# Patient Record
Sex: FEMALE | Race: WHITE | NOT HISPANIC OR LATINO | Employment: STUDENT | URBAN - METROPOLITAN AREA
[De-identification: names, ages, dates, MRNs, and addresses within clinical notes are randomized per-mention and may not be internally consistent; named-entity substitution may affect disease eponyms.]

---

## 2019-02-22 ENCOUNTER — TELEPHONE (OUTPATIENT)
Dept: GASTROENTEROLOGY | Facility: CLINIC | Age: 24
End: 2019-02-22

## 2019-02-22 DIAGNOSIS — K50.019 CROHN'S DISEASE OF SMALL INTESTINE WITH COMPLICATION (HCC): Primary | ICD-10-CM

## 2019-02-22 RX ORDER — MESALAMINE 1.2 G/1
TABLET, DELAYED RELEASE ORAL
Qty: 180 TABLET | Refills: 1 | Status: SHIPPED | OUTPATIENT
Start: 2019-02-22 | End: 2019-03-29 | Stop reason: SDUPTHER

## 2019-02-22 NOTE — TELEPHONE ENCOUNTER
YAS mssg from Pt's Mom, Tristian Mosquera; states she is coming home from school this weekend and [facility]; [facility]  [phone]

## 2019-03-29 ENCOUNTER — OFFICE VISIT (OUTPATIENT)
Dept: GASTROENTEROLOGY | Facility: CLINIC | Age: 24
End: 2019-03-29
Payer: COMMERCIAL

## 2019-03-29 VITALS
HEIGHT: 64 IN | DIASTOLIC BLOOD PRESSURE: 82 MMHG | HEART RATE: 68 BPM | WEIGHT: 158 LBS | BODY MASS INDEX: 26.98 KG/M2 | SYSTOLIC BLOOD PRESSURE: 112 MMHG

## 2019-03-29 DIAGNOSIS — K50.00 CROHN'S DISEASE OF ILEUM WITHOUT COMPLICATION (HCC): Primary | ICD-10-CM

## 2019-03-29 DIAGNOSIS — K50.019 CROHN'S DISEASE OF SMALL INTESTINE WITH COMPLICATION (HCC): ICD-10-CM

## 2019-03-29 PROCEDURE — 99214 OFFICE O/P EST MOD 30 MIN: CPT | Performed by: NURSE PRACTITIONER

## 2019-03-29 RX ORDER — TOPIRAMATE 25 MG/1
25 TABLET ORAL DAILY
COMMUNITY
End: 2022-02-28

## 2019-03-29 RX ORDER — MESALAMINE 1.2 G/1
TABLET, DELAYED RELEASE ORAL
Qty: 180 TABLET | Refills: 1 | Status: SHIPPED | OUTPATIENT
Start: 2019-03-29 | End: 2020-05-05 | Stop reason: SDUPTHER

## 2019-03-29 NOTE — PROGRESS NOTES
[id number] - Outpatient Follow-up Note  Darwin Hankins 26 y o  female MRN: [id number]  Encounter: [id number]    ASSESSMENT AND PLAN:      1  Crohn's disease of ileum without complication ([address] )  Diagnosed with Crohn's ileitis in December 2018  Symptom control on mesalamine 2 4 g daily  She was on budesonide at time of initial diagnosis but has weaned off of this medication without any difficulty  - Comprehensive metabolic panel; Future  - CBC and differential; Future  - Magnesium; Future  - Vitamin B12; Future  - Folate; Future  - Sedimentation rate, automated; Future  - Vitamin D 25 hydroxy; Future  - continue mesalamine 2 4 g daily  - FL small bowel; Future      Followup Appointment[de-identified]  6 months  ______________________________________________________________________    Chief Complaint   Patient presents with    Crohn's Disease     follow up     HPI:  She returns to the office for follow-up to newly diagnosed Crohn's ileitis at the end of last year  She is doing well on daily mesalamine  Denies any abdominal pain, diarrhea, rectal bleeding, weight loss, fevers, chills, myalgias, arthralgias, skin rashes or lesions  She is curious as to whether her Crohn's is anywhere else in her small bowel  Historical Information   History reviewed  No pertinent past medical history  History reviewed  No pertinent surgical history    Social History     Substance and Sexual Activity   Alcohol Use Yes    Frequency: 2-4 times a month    Drinks per session: 1 or 2     Social History     Substance and Sexual Activity   Drug Use Never     Social History     Tobacco Use   Smoking Status Never Smoker   Smokeless Tobacco Never Used     Family History   Problem Relation Age of Onset    Hypertension Mother     Colon polyps Father     Hypertension Father     Diabetes Father     MAY disease Brother     Colon polyps Maternal Uncle     Crohn's disease Cousin          Current Outpatient Medications:     mesalamine (LIALDA) 1 2 g EC tablet    topiramate (TOPAMAX) 25 mg tablet  No Known Allergies    10 Point REVIEW OF SYSTEMS IS OTHERWISE NEGATIVE with the exception of headaches, numbness and tingling and anxiety  PHYSICAL EXAM:    Blood pressure 112/82, pulse 68, height 5' 4" (1 626 m), weight 71 7 kg (158 lb)  Body mass index is 27 12 kg/m²  General Appearance:  Alert, cooperative, no distress  HEENT:  Normocephalic, atraumatic, anicteric  Lungs: Clear to auscultation bilaterally; no rales, rhonchi or wheezing; respirations unlabored   Heart: Regular rate and rhythm; no murmur, rub, or gallop  Abdomen:  Soft, non-tender, non-distended; normal bowel sounds; no masses, no organomegaly   Rectal:  Deferred   Extremities: No cyanosis, clubbing or edema   Skin: No jaundice, rashes, or lesions   Back: No CVA tenderness      Lab Results:   No results found for: WBC, HGB, HCT, MCV, PLT  No results found for: NA, K, CL, CO2, ANIONGAP, BUN, CREATININE, GLUCOSE, GLUF, CALCIUM, CORRECTEDCA, AST, ALT, ALKPHOS, PROT, BILITOT, EGFR  No results found for: IRON, TIBC, FERRITIN  No results found for: LIPASE    Radiology Results:   No results found

## 2019-03-30 LAB
25(OH)D3+25(OH)D2 SERPL-MCNC: 22.4 NG/ML (ref 30–100)
ALBUMIN SERPL-MCNC: 4.6 G/DL (ref 3.5–5.5)
ALBUMIN/GLOB SERPL: 1.5 {RATIO} (ref 1.2–2.2)
ALP SERPL-CCNC: 63 IU/L (ref 39–117)
ALT SERPL-CCNC: 13 IU/L (ref 0–32)
AST SERPL-CCNC: 17 IU/L (ref 0–40)
BASOPHILS # BLD AUTO: 0 X10E3/UL (ref 0–0.2)
BASOPHILS NFR BLD AUTO: 0 %
BILIRUB SERPL-MCNC: <0.2 MG/DL (ref 0–1.2)
BUN SERPL-MCNC: 11 MG/DL (ref 6–20)
BUN/CREAT SERPL: 11 (ref 9–23)
CALCIUM SERPL-MCNC: 9.5 MG/DL (ref 8.7–10.2)
CHLORIDE SERPL-SCNC: 106 MMOL/L (ref 96–106)
CO2 SERPL-SCNC: 18 MMOL/L (ref 20–29)
CREAT SERPL-MCNC: 0.98 MG/DL (ref 0.57–1)
EOSINOPHIL # BLD AUTO: 0.1 X10E3/UL (ref 0–0.4)
EOSINOPHIL NFR BLD AUTO: 1 %
ERYTHROCYTE [DISTWIDTH] IN BLOOD BY AUTOMATED COUNT: 13.8 % (ref 12.3–15.4)
ERYTHROCYTE [SEDIMENTATION RATE] IN BLOOD BY WESTERGREN METHOD: 26 MM/HR (ref 0–32)
FOLATE SERPL-MCNC: >20 NG/ML
GLOBULIN SER-MCNC: 3 G/DL (ref 1.5–4.5)
GLUCOSE SERPL-MCNC: 92 MG/DL (ref 65–99)
HCT VFR BLD AUTO: 39.1 % (ref 34–46.6)
HGB BLD-MCNC: 13.2 G/DL (ref 11.1–15.9)
IMM GRANULOCYTES # BLD: 0 X10E3/UL (ref 0–0.1)
IMM GRANULOCYTES NFR BLD: 0 %
LYMPHOCYTES # BLD AUTO: 2.2 X10E3/UL (ref 0.7–3.1)
LYMPHOCYTES NFR BLD AUTO: 40 %
MAGNESIUM SERPL-MCNC: 2.1 MG/DL (ref 1.6–2.3)
MCH RBC QN AUTO: 29.3 PG (ref 26.6–33)
MCHC RBC AUTO-ENTMCNC: 33.8 G/DL (ref 31.5–35.7)
MCV RBC AUTO: 87 FL (ref 79–97)
MONOCYTES # BLD AUTO: 0.6 X10E3/UL (ref 0.1–0.9)
MONOCYTES NFR BLD AUTO: 10 %
NEUTROPHILS # BLD AUTO: 2.7 X10E3/UL (ref 1.4–7)
NEUTROPHILS NFR BLD AUTO: 49 %
PLATELET # BLD AUTO: 394 X10E3/UL (ref 150–379)
POTASSIUM SERPL-SCNC: 3.9 MMOL/L (ref 3.5–5.2)
PROT SERPL-MCNC: 7.6 G/DL (ref 6–8.5)
RBC # BLD AUTO: 4.5 X10E6/UL (ref 3.77–5.28)
SL AMB EGFR AFRICAN AMERICAN: 95 ML/MIN/1.73
SL AMB EGFR NON AFRICAN AMERICAN: 83 ML/MIN/1.73
SODIUM SERPL-SCNC: 138 MMOL/L (ref 134–144)
VIT B12 SERPL-MCNC: 431 PG/ML (ref 232–1245)
WBC # BLD AUTO: 5.6 X10E3/UL (ref 3.4–10.8)

## 2019-04-12 ENCOUNTER — TELEPHONE (OUTPATIENT)
Dept: GASTROENTEROLOGY | Facility: CLINIC | Age: 24
End: 2019-04-12

## 2020-03-09 ENCOUNTER — TELEPHONE (OUTPATIENT)
Dept: GASTROENTEROLOGY | Facility: CLINIC | Age: 25
End: 2020-03-09

## 2020-03-09 DIAGNOSIS — K50.019 CROHN'S DISEASE OF ILEUM WITH COMPLICATION (HCC): Primary | ICD-10-CM

## 2020-03-09 NOTE — TELEPHONE ENCOUNTER
Yes, please call patient and have her get a CBC, CBC, ESR, fecal calprotectin, stool for C diff and stool for enteric pathogens, thank you

## 2020-03-09 NOTE — TELEPHONE ENCOUNTER
Pt left  mssg stating she is a bit concerned; had some blood in her stool; was diagnosed w/ Crohn's last yr; takes two Mesalamine daily; questions what to do or if she needs to come in?; asks for CB to [phone]

## 2020-03-09 NOTE — TELEPHONE ENCOUNTER
I spoke with patient when our computer system was down  I offered her an appointment today, but she is away at school  I scheduled her for 3/20/2020 Dr Bedolla Height  She reports lower right abdominal pain/tenderness, cramping prior to defecating  Denies straining, bowels usually on the softer/formed side  1-2 Bm's daily  In the past few days she has been noticing looser bowels mixed with bright red blood upon wiping  She couldn't give me an amount of blood  Bleeding started last week, she is under a moderate amount of stress with school  At times she has waves of nausea & decreased appetite  She denies fever, severe abd pain, diarrhea and weight loss  She is maintained on Lialda (mesalamine) 1 2  (2) tablets orally daily  She will be coming home this weekend, so she could go to [facility] if blood work or stool studies would be clinically helpful prior to office visit

## 2020-03-10 NOTE — TELEPHONE ENCOUNTER
I emailed patient lab orders:  Rickie Cleveland,   I wanted to touch base with you after our phone call yesterday  Thank you for your patience while our system was down  I checked with a provider and they agreed that blood work & stool studies should be completed prior to your office visit  It sounds as though you are in the beginning stages of a flare and these lab values will help to further evaluate  The orders have been sent electronically, but I also attached them if you would like a hard copy to take to the lab  I was also able to "lock in" your appointment we discussed  3/20/2020 Dr Sang Serna at 3:30 pm 3:15 arrival  Again if you are having worsening abdominal pain, urgency/frequency of your bowel movements, increased bleeding ( saturating a pad within an hour) or clots, and or fever please go to urgent care at your school or contact the office

## 2020-03-14 LAB
ALBUMIN SERPL-MCNC: 4.2 G/DL (ref 3.9–5)
ALBUMIN/GLOB SERPL: 1.7 {RATIO} (ref 1.2–2.2)
ALP SERPL-CCNC: 66 IU/L (ref 39–117)
ALT SERPL-CCNC: 13 IU/L (ref 0–32)
AST SERPL-CCNC: 14 IU/L (ref 0–40)
BASOPHILS # BLD AUTO: 0 X10E3/UL (ref 0–0.2)
BASOPHILS NFR BLD AUTO: 0 %
BILIRUB SERPL-MCNC: <0.2 MG/DL (ref 0–1.2)
BUN SERPL-MCNC: 12 MG/DL (ref 6–20)
BUN/CREAT SERPL: 16 (ref 9–23)
CALCIUM SERPL-MCNC: 9.2 MG/DL (ref 8.7–10.2)
CHLORIDE SERPL-SCNC: 103 MMOL/L (ref 96–106)
CO2 SERPL-SCNC: 21 MMOL/L (ref 20–29)
CREAT SERPL-MCNC: 0.73 MG/DL (ref 0.57–1)
EOSINOPHIL # BLD AUTO: 0.1 X10E3/UL (ref 0–0.4)
EOSINOPHIL NFR BLD AUTO: 2 %
ERYTHROCYTE [DISTWIDTH] IN BLOOD BY AUTOMATED COUNT: 12.3 % (ref 11.7–15.4)
ERYTHROCYTE [SEDIMENTATION RATE] IN BLOOD BY WESTERGREN METHOD: 20 MM/HR (ref 0–32)
GLOBULIN SER-MCNC: 2.5 G/DL (ref 1.5–4.5)
GLUCOSE SERPL-MCNC: 102 MG/DL (ref 65–99)
HCT VFR BLD AUTO: 38.1 % (ref 34–46.6)
HGB BLD-MCNC: 12.7 G/DL (ref 11.1–15.9)
IMM GRANULOCYTES # BLD: 0 X10E3/UL (ref 0–0.1)
IMM GRANULOCYTES NFR BLD: 0 %
LYMPHOCYTES # BLD AUTO: 2 X10E3/UL (ref 0.7–3.1)
LYMPHOCYTES NFR BLD AUTO: 39 %
MCH RBC QN AUTO: 28.5 PG (ref 26.6–33)
MCHC RBC AUTO-ENTMCNC: 33.3 G/DL (ref 31.5–35.7)
MCV RBC AUTO: 86 FL (ref 79–97)
MONOCYTES # BLD AUTO: 0.3 X10E3/UL (ref 0.1–0.9)
MONOCYTES NFR BLD AUTO: 6 %
NEUTROPHILS # BLD AUTO: 2.6 X10E3/UL (ref 1.4–7)
NEUTROPHILS NFR BLD AUTO: 53 %
PLATELET # BLD AUTO: 404 X10E3/UL (ref 150–450)
POTASSIUM SERPL-SCNC: 4.1 MMOL/L (ref 3.5–5.2)
PROT SERPL-MCNC: 6.7 G/DL (ref 6–8.5)
RBC # BLD AUTO: 4.45 X10E6/UL (ref 3.77–5.28)
SL AMB EGFR AFRICAN AMERICAN: 135 ML/MIN/1.73
SL AMB EGFR NON AFRICAN AMERICAN: 117 ML/MIN/1.73
SODIUM SERPL-SCNC: 139 MMOL/L (ref 134–144)
WBC # BLD AUTO: 5 X10E3/UL (ref 3.4–10.8)

## 2020-03-19 LAB

## 2020-03-19 NOTE — RESULT ENCOUNTER NOTE
H/o Crohns  Poss flare  Rec obtaining CT A/P  We can discuss tomorrow at [facility]

## 2020-03-19 NOTE — RESULT ENCOUNTER NOTE
Stool studies negative, except elevated fecal calprotectin  She has an appoint with Dr Sang Serna tomorrow to discuss

## 2020-05-05 ENCOUNTER — TELEMEDICINE (OUTPATIENT)
Dept: GASTROENTEROLOGY | Facility: CLINIC | Age: 25
End: 2020-05-05
Payer: COMMERCIAL

## 2020-05-05 VITALS — WEIGHT: 140 LBS | HEIGHT: 64 IN | BODY MASS INDEX: 23.9 KG/M2

## 2020-05-05 DIAGNOSIS — K50.019 CROHN'S DISEASE OF SMALL INTESTINE WITH COMPLICATION (HCC): Primary | ICD-10-CM

## 2020-05-05 DIAGNOSIS — Z12.11 COLON CANCER SCREENING: ICD-10-CM

## 2020-05-05 PROCEDURE — 99214 OFFICE O/P EST MOD 30 MIN: CPT | Performed by: INTERNAL MEDICINE

## 2020-05-05 RX ORDER — MESALAMINE 1.2 G/1
TABLET, DELAYED RELEASE ORAL
Qty: 180 TABLET | Refills: 1 | Status: SHIPPED | OUTPATIENT
Start: 2020-05-05 | End: 2020-10-02

## 2020-10-02 DIAGNOSIS — K50.019 CROHN'S DISEASE OF SMALL INTESTINE WITH COMPLICATION (HCC): ICD-10-CM

## 2020-10-02 RX ORDER — MESALAMINE 1.2 G/1
TABLET, DELAYED RELEASE ORAL
Qty: 180 TABLET | Refills: 1 | Status: SHIPPED | OUTPATIENT
Start: 2020-10-02 | End: 2020-12-17 | Stop reason: SDUPTHER

## 2020-12-16 RX ORDER — LORAZEPAM 1 MG/1
1 TABLET ORAL AS NEEDED
COMMUNITY

## 2020-12-17 ENCOUNTER — TELEMEDICINE (OUTPATIENT)
Dept: GASTROENTEROLOGY | Facility: CLINIC | Age: 25
End: 2020-12-17
Payer: COMMERCIAL

## 2020-12-17 DIAGNOSIS — K50.00 CROHN'S DISEASE OF ILEUM WITHOUT COMPLICATION (HCC): Primary | ICD-10-CM

## 2020-12-17 DIAGNOSIS — Z12.11 COLON CANCER SCREENING: ICD-10-CM

## 2020-12-17 DIAGNOSIS — K50.019 CROHN'S DISEASE OF SMALL INTESTINE WITH COMPLICATION (HCC): ICD-10-CM

## 2020-12-17 PROCEDURE — 99214 OFFICE O/P EST MOD 30 MIN: CPT | Performed by: INTERNAL MEDICINE

## 2020-12-17 RX ORDER — MESALAMINE 1.2 G/1
TABLET, DELAYED RELEASE ORAL
Qty: 180 TABLET | Refills: 1 | Status: SHIPPED | OUTPATIENT
Start: 2020-12-17 | End: 2021-02-02

## 2020-12-31 LAB
25(OH)D3+25(OH)D2 SERPL-MCNC: 14.1 NG/ML (ref 30–100)
ALBUMIN SERPL-MCNC: 4 G/DL (ref 3.9–5)
ALBUMIN/GLOB SERPL: 1.4 {RATIO} (ref 1.2–2.2)
ALP SERPL-CCNC: 83 IU/L (ref 39–117)
ALT SERPL-CCNC: 18 IU/L (ref 0–32)
AST SERPL-CCNC: 17 IU/L (ref 0–40)
BILIRUB SERPL-MCNC: <0.2 MG/DL (ref 0–1.2)
BUN SERPL-MCNC: 15 MG/DL (ref 6–20)
BUN/CREAT SERPL: 21 (ref 9–23)
CALCIUM SERPL-MCNC: 9.4 MG/DL (ref 8.7–10.2)
CHLORIDE SERPL-SCNC: 105 MMOL/L (ref 96–106)
CO2 SERPL-SCNC: 21 MMOL/L (ref 20–29)
CREAT SERPL-MCNC: 0.73 MG/DL (ref 0.57–1)
CRP SERPL-MCNC: 6 MG/L (ref 0–10)
ERYTHROCYTE [DISTWIDTH] IN BLOOD BY AUTOMATED COUNT: 12.3 % (ref 11.7–15.4)
ERYTHROCYTE [SEDIMENTATION RATE] IN BLOOD BY WESTERGREN METHOD: 35 MM/HR (ref 0–32)
GLOBULIN SER-MCNC: 2.8 G/DL (ref 1.5–4.5)
GLUCOSE SERPL-MCNC: 102 MG/DL (ref 65–99)
HCT VFR BLD AUTO: 39.3 % (ref 34–46.6)
HGB BLD-MCNC: 13.3 G/DL (ref 11.1–15.9)
MCH RBC QN AUTO: 28.7 PG (ref 26.6–33)
MCHC RBC AUTO-ENTMCNC: 33.8 G/DL (ref 31.5–35.7)
MCV RBC AUTO: 85 FL (ref 79–97)
PLATELET # BLD AUTO: 425 X10E3/UL (ref 150–450)
POTASSIUM SERPL-SCNC: 4.3 MMOL/L (ref 3.5–5.2)
PROT SERPL-MCNC: 6.8 G/DL (ref 6–8.5)
RBC # BLD AUTO: 4.63 X10E6/UL (ref 3.77–5.28)
SL AMB EGFR AFRICAN AMERICAN: 134 ML/MIN/1.73
SL AMB EGFR NON AFRICAN AMERICAN: 116 ML/MIN/1.73
SODIUM SERPL-SCNC: 137 MMOL/L (ref 134–144)
WBC # BLD AUTO: 7.3 X10E3/UL (ref 3.4–10.8)

## 2021-01-04 ENCOUNTER — TELEPHONE (OUTPATIENT)
Dept: GASTROENTEROLOGY | Facility: CLINIC | Age: 26
End: 2021-01-04

## 2021-01-04 DIAGNOSIS — E55.9 VITAMIN D DEFICIENCY: Primary | ICD-10-CM

## 2021-01-04 RX ORDER — MELATONIN
2000 DAILY
Qty: 180 TABLET | Refills: 1 | Status: SHIPPED | OUTPATIENT
Start: 2021-01-04 | End: 2021-07-19 | Stop reason: SDUPTHER

## 2021-01-05 NOTE — TELEPHONE ENCOUNTER
My chart message sent that lab order is being mailed to home address and to complete in three months

## 2021-02-02 DIAGNOSIS — K50.019 CROHN'S DISEASE OF SMALL INTESTINE WITH COMPLICATION (HCC): ICD-10-CM

## 2021-02-02 RX ORDER — MESALAMINE 1.2 G/1
TABLET, DELAYED RELEASE ORAL
Qty: 60 TABLET | Refills: 5 | Status: SHIPPED | OUTPATIENT
Start: 2021-02-02 | End: 2022-01-07 | Stop reason: SDUPTHER

## 2021-05-14 ENCOUNTER — TELEPHONE (OUTPATIENT)
Dept: GASTROENTEROLOGY | Facility: CLINIC | Age: 26
End: 2021-05-14

## 2021-05-14 NOTE — TELEPHONE ENCOUNTER
Pt left  mssg noting she is getting worried/thinks she might be starting w/ a Crohn's flare; has bad abdominal pain waking her up at night; also had a couple episodes of diarrhea/one was bloody; is unsure if she needs an appt?/wants to alert [facility]# [phone]

## 2021-05-17 NOTE — TELEPHONE ENCOUNTER
Portal message sent to patient to follow up from Friday's telephone call as she was concerned she may be flaring  Awaiting response or callback

## 2021-06-01 ENCOUNTER — TELEPHONE (OUTPATIENT)
Dept: GASTROENTEROLOGY | Facility: CLINIC | Age: 26
End: 2021-06-01

## 2021-06-01 DIAGNOSIS — K50.019 CROHN'S DISEASE OF SMALL INTESTINE WITH COMPLICATION (HCC): Primary | ICD-10-CM

## 2021-06-01 NOTE — TELEPHONE ENCOUNTER
Cramping pain on the lower right side of your abdomen     Feels like a band across both left and right side of lower abdomen  Pain ranges from 6/10 to 9/10  Does wake her up at night sometimes and is 10/10      Diarrhea that may be dark or tar-colored, or blood in your bowel movements     Does have diarrhea, blood on wiping  # of BM per day -1 to 3 times a day    Fever  - no      More mentally and physically tired than usual (fatigue)   - yes more fatigue     Nausea, loss of appetite, or weight loss without trying    - no     Adding to wait list

## 2021-06-01 NOTE — TELEPHONE ENCOUNTER
Pt called to make an appt/was transferred  I conf'd receipt of mssg; noted sent mssg to Nrsg; conf'd CB # above  Pt states she has pain at least every other day; pain is not localized in abdomen but rates up to 9/10

## 2021-06-01 NOTE — TELEPHONE ENCOUNTER
Pt left  mssg stating she is still bleeding; still has diarrhea; was unable to f/u earlier; is unsure what to do; req [phone] to advise/asks if she needs appt?

## 2021-06-02 NOTE — TELEPHONE ENCOUNTER
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

## 2021-06-08 RX ORDER — CHOLESTYRAMINE 4 G/9G
1 POWDER, FOR SUSPENSION ORAL 2 TIMES DAILY WITH MEALS
Qty: 90 EACH | Refills: 1 | Status: SHIPPED | OUTPATIENT
Start: 2021-06-08 | End: 2021-08-10

## 2021-06-08 NOTE — TELEPHONE ENCOUNTER
Let's have patient get some labs and stools studies first      OK to start some bentyl for now  Will need OFV after  Rx sent to pharmacy  Labs ordered

## 2021-06-08 NOTE — TELEPHONE ENCOUNTER
Just checking--you wrote to start some Bentyl but Questran is noted in the medications as ordered today? ?  Thanks

## 2021-06-08 NOTE — TELEPHONE ENCOUNTER
Pt called back, she was notified of the labs and stool studies ordered to [facility] as well as Questran being sent to her pharmacy  Is already on the wait list for an ov

## 2021-06-08 NOTE — TELEPHONE ENCOUNTER
Please see Carline's previous message for more information  Pt called back, she was diagnosed with crohn's disease years ago and is now having issues as she did prior to her diagnosis  She is having rectal bleeding with bowel movements 1-2 times weekly but is a larger amount than usual, along with abdominal pain daily and it wakes up at night 1-2 every other week and then spends hours on the toilet with diarrhea  She often has flare ups "here and there" but this is worse than normal  Denies nausea, vomiting, fever or loss of appetite  She is taking Mesalamine 1 2 g 2 daily along with the Vit  D 2 daily  Pt placed on the waiting list for an apt  Could you please advise until her apt  Pharmacy confirmed

## 2021-06-14 NOTE — TELEPHONE ENCOUNTER
Pt left  mssg stating she called last wk; GS ord'd BW; wants to inform that sx are increasing in intensity/had 3 or 4 more bloody stool occurrences over the weekend w/ cramping  CB# [phone]

## 2021-06-14 NOTE — TELEPHONE ENCOUNTER
Spoke with patient  She was unable to completed labs/stool studies  Is going to try to do so this week  Symptoms worsening

## 2021-06-14 NOTE — TELEPHONE ENCOUNTER
Called patient and left message to find out more about her symptoms  If symptoms are worsening and she is unable to get labs/stool studies she should consider further evaluation in the ED

## 2021-07-10 LAB
25(OH)D3+25(OH)D2 SERPL-MCNC: 14.1 NG/ML (ref 30–100)
ALBUMIN SERPL-MCNC: 4.2 G/DL (ref 3.9–5)
ALBUMIN/GLOB SERPL: 1.6 {RATIO} (ref 1.2–2.2)
ALP SERPL-CCNC: 78 IU/L (ref 48–121)
ALT SERPL-CCNC: 13 IU/L (ref 0–32)
AST SERPL-CCNC: 15 IU/L (ref 0–40)
BASOPHILS # BLD AUTO: 0 X10E3/UL (ref 0–0.2)
BASOPHILS NFR BLD AUTO: 0 %
BILIRUB SERPL-MCNC: <0.2 MG/DL (ref 0–1.2)
BUN SERPL-MCNC: 11 MG/DL (ref 6–20)
BUN/CREAT SERPL: 14 (ref 9–23)
CALCIUM SERPL-MCNC: 9.3 MG/DL (ref 8.7–10.2)
CHLORIDE SERPL-SCNC: 103 MMOL/L (ref 96–106)
CO2 SERPL-SCNC: 20 MMOL/L (ref 20–29)
CREAT SERPL-MCNC: 0.79 MG/DL (ref 0.57–1)
CRP SERPL-MCNC: 8 MG/L (ref 0–10)
EOSINOPHIL # BLD AUTO: 0.2 X10E3/UL (ref 0–0.4)
EOSINOPHIL NFR BLD AUTO: 3 %
ERYTHROCYTE [DISTWIDTH] IN BLOOD BY AUTOMATED COUNT: 13.9 % (ref 11.7–15.4)
ERYTHROCYTE [SEDIMENTATION RATE] IN BLOOD BY WESTERGREN METHOD: 29 MM/HR (ref 0–32)
GLOBULIN SER-MCNC: 2.7 G/DL (ref 1.5–4.5)
GLUCOSE SERPL-MCNC: 113 MG/DL (ref 65–99)
HCT VFR BLD AUTO: 40.3 % (ref 34–46.6)
HGB BLD-MCNC: 12.9 G/DL (ref 11.1–15.9)
IMM GRANULOCYTES # BLD: 0 X10E3/UL (ref 0–0.1)
IMM GRANULOCYTES NFR BLD: 0 %
LYMPHOCYTES # BLD AUTO: 2.7 X10E3/UL (ref 0.7–3.1)
LYMPHOCYTES NFR BLD AUTO: 37 %
MCH RBC QN AUTO: 26.9 PG (ref 26.6–33)
MCHC RBC AUTO-ENTMCNC: 32 G/DL (ref 31.5–35.7)
MCV RBC AUTO: 84 FL (ref 79–97)
MONOCYTES # BLD AUTO: 0.5 X10E3/UL (ref 0.1–0.9)
MONOCYTES NFR BLD AUTO: 7 %
NEUTROPHILS # BLD AUTO: 3.9 X10E3/UL (ref 1.4–7)
NEUTROPHILS NFR BLD AUTO: 53 %
PLATELET # BLD AUTO: 465 X10E3/UL (ref 150–450)
POTASSIUM SERPL-SCNC: 4.4 MMOL/L (ref 3.5–5.2)
PROT SERPL-MCNC: 6.9 G/DL (ref 6–8.5)
RBC # BLD AUTO: 4.79 X10E6/UL (ref 3.77–5.28)
SL AMB EGFR AFRICAN AMERICAN: 122 ML/MIN/1.73
SL AMB EGFR NON AFRICAN AMERICAN: 106 ML/MIN/1.73
SODIUM SERPL-SCNC: 138 MMOL/L (ref 134–144)
WBC # BLD AUTO: 7.3 X10E3/UL (ref 3.4–10.8)

## 2021-07-13 NOTE — RESULT ENCOUNTER NOTE
Normal labs except ongoing low vitamin D levels  Can we follow up with the patient and make sure she's taking vit D supplementation daily? 2000IU daily

## 2021-07-19 DIAGNOSIS — E55.9 VITAMIN D DEFICIENCY: ICD-10-CM

## 2021-07-19 RX ORDER — MELATONIN
2000 DAILY
Qty: 180 TABLET | Refills: 1 | Status: SHIPPED | OUTPATIENT
Start: 2021-07-19 | End: 2022-02-28 | Stop reason: SDUPTHER

## 2021-07-19 NOTE — TELEPHONE ENCOUNTER
I called pt's cell and left message reviewing result note and asked for reply via [employer] or call to confirm she is taking VitD as ordered

## 2021-07-19 NOTE — TELEPHONE ENCOUNTER
Refill entered and sent for GS to sign off  Only did for 2 months  Do you want patient to have labs again?

## 2021-07-19 NOTE — TELEPHONE ENCOUNTER
----- Message from Asiya Wick MD sent at 7/13/2021  4:47 PM EDT -----  Normal labs except ongoing low vitamin D levels  Can we follow up with the patient and make sure she's taking vit D supplementation daily? 2000IU daily

## 2021-07-19 NOTE — TELEPHONE ENCOUNTER
Pt calling again  Rev'd above - noted Rx for 2 mos submitted for GS to sign off on and questioning her about labs  Pt req CB to [phone] if add'l labs needed

## 2021-07-19 NOTE — TELEPHONE ENCOUNTER
Pt left  smsg stating she got a mssg that her Vit D levels are low; hasn't been able to refill/hasn't taken supplements for about a month; needs new script if she doesn't have refills   # [id number]

## 2022-01-07 DIAGNOSIS — K50.019 CROHN'S DISEASE OF SMALL INTESTINE WITH COMPLICATION (HCC): ICD-10-CM

## 2022-01-07 RX ORDER — MESALAMINE 1.2 G/1
2.4 TABLET, DELAYED RELEASE ORAL DAILY
Qty: 180 TABLET | Refills: 0 | Status: SHIPPED | OUTPATIENT
Start: 2022-01-07 | End: 2022-02-28 | Stop reason: SDUPTHER

## 2022-01-07 NOTE — TELEPHONE ENCOUNTER
Pt left  mssg stating she has Crohn's; requests Rx for [facility] but [facility]; req CB to advise of [phone]

## 2022-01-07 NOTE — TELEPHONE ENCOUNTER
Called pt back, confirmed her Mesalamine dose of 1 2 g (2) daily and pharmacy  Did request she make an apt as was seen last 12/20 and a 6 month follow up was recommended  Pt agreeable and transferred to scheduling  (Was scheduled 2/28)  Medication entered for your review and signature

## 2022-02-28 ENCOUNTER — TELEMEDICINE (OUTPATIENT)
Dept: GASTROENTEROLOGY | Facility: CLINIC | Age: 27
End: 2022-02-28
Payer: COMMERCIAL

## 2022-02-28 VITALS — HEIGHT: 64 IN | BODY MASS INDEX: 23.9 KG/M2 | WEIGHT: 140 LBS

## 2022-02-28 DIAGNOSIS — K21.9 GASTROESOPHAGEAL REFLUX DISEASE WITHOUT ESOPHAGITIS: ICD-10-CM

## 2022-02-28 DIAGNOSIS — E55.9 VITAMIN D DEFICIENCY: ICD-10-CM

## 2022-02-28 DIAGNOSIS — Z12.11 COLON CANCER SCREENING: ICD-10-CM

## 2022-02-28 DIAGNOSIS — K50.019 CROHN'S DISEASE OF SMALL INTESTINE WITH COMPLICATION (HCC): Primary | ICD-10-CM

## 2022-02-28 PROCEDURE — 99214 OFFICE O/P EST MOD 30 MIN: CPT | Performed by: INTERNAL MEDICINE

## 2022-02-28 RX ORDER — MESALAMINE 1.2 G/1
2.4 TABLET, DELAYED RELEASE ORAL DAILY
Qty: 180 TABLET | Refills: 0 | Status: SHIPPED | OUTPATIENT
Start: 2022-02-28 | End: 2022-05-29

## 2022-02-28 RX ORDER — MELATONIN
2000 DAILY
Qty: 180 TABLET | Refills: 1 | Status: SHIPPED | OUTPATIENT
Start: 2022-02-28

## 2022-02-28 RX ORDER — NORETHINDRONE ACETATE AND ETHINYL ESTRADIOL AND FERROUS FUMARATE 1MG-20(21)
1 KIT ORAL DAILY
COMMUNITY
Start: 2021-12-30

## 2022-02-28 RX ORDER — CHOLESTYRAMINE 4 G/9G
1 POWDER, FOR SUSPENSION ORAL 2 TIMES DAILY WITH MEALS
Qty: 120 PACKET | Refills: 1 | Status: SHIPPED | OUTPATIENT
Start: 2022-02-28

## 2022-02-28 NOTE — PATIENT INSTRUCTIONS
IBD Healthcare maintenance recommendations:    Vaccines and infections  1) avoid live vaccines  2) yearly flu shot  3) Prevnar 13, followed by Pneumovax at least 8 weeks later (and a Pneumovax booster 5 years after)  4) Shingrix  5) Quantiferon prior to immunosuppression and then annually  6) MMR prior to biologic  7) varicella prior to biologic  8) HPV vaccination as per national guidelines  9) hepatitis a and B vaccinations if not previously vaccinated    Cancer screening  1) dysplasia surveillance for colorectal cancer  2) Pap smears (especially for woman on immunosuppression)  3) annual dermatologic/skin exam    Miscellaneous  1) screening for osteoporosis  2) smoking screen in counseling  3) depression screen annually      Health Maintenance recomendations guidelines based on [facility] recommendations    Vaccines  1) Influenza vaccine ("Flu shot") - Dead virus- annually is suggested   2 and 3) Pneumonia vaccinations for all patient's older than 19 in receiving systemic immunosuppression  4) shingles vaccine with non live virus in all patients receiving systemic immunosuppression is specially Angelic Miramonte  5) QuantiFERON prior to immunosuppression and then annually  6) If the patient is not immune to measles mumps or rubella should receive immunization, but this should be avoided in patients on systemic immunosuppression  7) if the patient is not immune to varicella they should receive immunization, but this should be avoided in patients on systemic immunosuppression  8) HPV vaccination as per national guidelines  9) hepatitis a and B vaccinations if not previously vaccinated     Cancer screening  1) colonoscopy in all patients with extensive colitis (more than 1/3 of the colon involved) who had disease for at least 8 or more years  Repeat colonoscopy approximately every 12-24 months  In patients with concurrent primary sclerosing cholangitis repeat colonoscopy annually and consider use of cromolyn endoscopy    Consider use of cromolyn endoscopy in patients with prior dysplasia or a family history of colon cancer  2) in all woman with inflammatory bowel disease treated with systemic immunosuppression  3) in all patients with IBD, especially those on immunosuppression including anti TNF therapy as well as thiopurines    Miscellaneous  1) DEXA scan in all patients with IBD if any risk factor for osteoporosis (low BMI, 3 months or more of cumulative steroid exposure, smoker, postmenopausal)    If initial screen is normal repeat in 5 years  2) screen all patients with IBD for smoking status and refer current smokers for smoking cessation therapy  3) In all patients with IBD at baseline and consider annual screening

## 2022-02-28 NOTE — Clinical Note
TK: pls call pt to schedule EGD at [facility]  Nursing: pls fax labs for pts to obtain prior to EGD       TY

## 2022-02-28 NOTE — PROGRESS NOTES
Virtual Regular Visit    Verification of patient location:    Patient is located in the following state in which I hold an active license [place]      Assessment/Plan:    Problem List Items Addressed This Visit        Digestive    Crohn's disease of small intestine with complication ([facility] [address] ) - Primary    Relevant Medications    cholestyramine (QUESTRAN) 4 g packet    mesalamine (LIALDA) 1 2 g EC tablet    Other Relevant Orders    CBC and differential    Comprehensive metabolic panel    C-reactive protein    Sedimentation rate, automated    Gastroesophageal reflux disease without esophagitis       Other    Colon cancer screening    Vitamin D deficiency    Relevant Medications    cholecalciferol (VITAMIN D3) 1,000 units tablet    Other Relevant Orders    Vitamin D 25 hydroxy               Reason for visit is   Chief Complaint   Patient presents with    Follow-up     Crohn's    Virtual Regular Visit        Encounter provider Christiano Vicente MD    Provider located at [place] [id number] [phone]      Recent Visits  No visits were found meeting these conditions  Showing recent visits within past 7 days and meeting all other requirements  Today's Visits  Date Type Provider Dept   02/28/22 Telemedicine Christiano Vicente MD [facility]   Showing today's visits and meeting all other requirements  Future Appointments  No visits were found meeting these conditions  Showing future appointments within next 150 days and meeting all other requirements       The patient was identified by name and date of birth  Joe Guevara was informed that this is a telemedicine visit and that the visit is being conducted through [address] and patient was informed this is a secure, HIPAA-complaint platform  She agrees to proceed     My office door was closed  No one else was in the room    She acknowledged consent and understanding of privacy and security of the video platform  The patient has agreed to participate and understands they can discontinue the visit at any time  Patient is aware this is a billable service  Subjective  Lottie Davalos is a 27 y o  female past medical history significant for Crohn's ileitis here today for follow-up  Overall doing well  Only having 1-3 soft formed bowel movements a day  No abdominal pain or bleeding  No joint pains or rashes  Only taking mesalamine and Questran p r n  Has been under some stress with school but doing well overall  Her only complaint today is that she is having some heartburn couple times a week requiring her to use some over-the-counter antacids  The antacids to help  ASSESSMENT AND PLAN:      1  Crohn's disease of small intestine with complication [facility])  [id number] female with Crohn's, diagnosed in 2018, negative follow-through in 2019  Overall doing well  For now will continue Lialda  - cholestyramine (QUESTRAN) 4 g packet; Take 1 packet (4 g total) by mouth 2 (two) times a day with meals  Dispense: 120 packet; Refill: 1  - mesalamine (LIALDA) 1 2 g EC tablet; Take 2 tablets (2 4 g total) by mouth daily  Dispense: 180 tablet; Refill: 0  - CBC and differential; Future  - Comprehensive metabolic panel; Future  - C-reactive protein; Future  - Sedimentation rate, automated; Future  - discussed healthcare maintenance with patient    -annual dermatology examination    2  Vitamin D deficiency  Taking vitamin-D will recheck levels    - cholecalciferol (VITAMIN D3) 1,000 units tablet; Take 2 tablets (2,000 Units total) by mouth daily  Dispense: 180 tablet; Refill: 1  - Vitamin D 25 hydroxy; Future  - Vitamin D 25 hydroxy    3  Gastroesophageal reflux disease without esophagitis    - GERD lifestyle modifications and weight loss  - Schedule EGD @ [address]      4  Colon cancer screening  No colitis on colonoscopy December 2018, recall December 2023      Followup Appointment:  6 months  ______________________________________________________________________      Historical Information   Past Medical History:   Diagnosis Date    Ileitis 12/2018    seen on colo 12/18/2018     Past Surgical History:   Procedure Laterality Date    COLONOSCOPY  12/18/2018    OVARIAN CYST REMOVAL      WISDOM TOOTH EXTRACTION       Social History     Substance and Sexual Activity   Alcohol Use Yes     Social History     Substance and Sexual Activity   Drug Use Never     Social History     Tobacco Use   Smoking Status Never Smoker   Smokeless Tobacco Never Used     Family History   Problem Relation Age of Onset    Hypertension Mother     Colon polyps Mother         per GI notes    Colon polyps Father     Hypertension Father     Diabetes Father     MAY disease Father     MAY disease Brother     Colon polyps Maternal Uncle     Crohn's disease Cousin     Colorectal Cancer Neg Hx        Meds/Allergies       Current Outpatient Medications:     cholecalciferol (VITAMIN D3) 1,000 units tablet    cholestyramine (QUESTRAN) 4 g packet    Junel FE 1/20 1-20 MG-MCG per tablet    LORazepam (ATIVAN) 1 mg tablet    mesalamine (LIALDA) 1 2 g EC tablet    No Known Allergies    PHYSICAL EXAM:    Height 5' 4" (1 626 m), weight 63 5 kg (140 lb)  Body mass index is 24 03 kg/m²  Appearance and vitals taken from home devices    General Appearance:   Alert, cooperative, no distress   HEENT:  Normocephalic, atraumatic, anicteric  Neck supple, symmetrical, trachea midline  Lungs:   Equal chest rise and unlabored breathing, normal effort, no coughing  Cardiovascular:   No visualized JVD or lower extremity edema  Abdomen:   No abdominal distension  Skin:   No jaundice, rashes, or lesions  Musculoskeletal:   Normal range of motion visualized  10 feet gait without difficulty and without assistive device  Psych:  Normal affect and normal insight     Neuro:  Alert and appropriate  Ox3         Lab Results:   Lab Results   Component Value Date    WBC 7 3 07/09/2021    HGB 12 9 07/09/2021    HCT 40 3 07/09/2021    MCV 84 07/09/2021     (H) 07/09/2021     Lab Results   Component Value Date    K 4 4 07/09/2021     07/09/2021    CO2 20 07/09/2021    BUN 11 07/09/2021    CREATININE 0 79 07/09/2021    AST 15 07/09/2021    ALT 13 07/09/2021     No results found for: IRON, TIBC, FERRITIN  No results found for: LIPASE    Radiology Results:   No results found  REVIEW OF SYSTEMS:    CONSTITUTIONAL: Denies any fever, chills, rigors, and weight loss  HEENT: No earache or tinnitus  Denies hearing loss or visual disturbances  CARDIOVASCULAR: No chest pain or palpitations  RESPIRATORY: Denies any cough, hemoptysis, shortness of breath or dyspnea on exertion  GASTROINTESTINAL: As noted in the History of Present Illness  GENITOURINARY: No problems with urination  Denies any hematuria or dysuria  NEUROLOGIC: No dizziness or vertigo, denies headaches  MUSCULOSKELETAL: Denies any muscle or joint pain  SKIN: Denies skin rashes or itching  ENDOCRINE: Denies excessive thirst  Denies intolerance to heat or cold  PSYCHOSOCIAL: Denies depression or anxiety  Denies any recent memory loss  I spent 20 minutes directly with the patient during this visit    VIRTUAL VISIT DISCLAIMER      Rashi Easton verbally agrees to participate in [employer]  Pt is aware that [employer] could be limited without vital signs or the ability to perform a full hands-on physical Agueda Dose understands she or the provider may request at any time to terminate the video visit and request the patient to seek care or treatment in person

## 2022-03-01 ENCOUNTER — TELEPHONE (OUTPATIENT)
Dept: GASTROENTEROLOGY | Facility: CLINIC | Age: 27
End: 2022-03-01

## 2022-04-09 LAB
25(OH)D3+25(OH)D2 SERPL-MCNC: 16.1 NG/ML (ref 30–100)
ALBUMIN SERPL-MCNC: 4.3 G/DL (ref 3.9–5)
ALBUMIN/GLOB SERPL: 1.5 {RATIO} (ref 1.2–2.2)
ALP SERPL-CCNC: 69 IU/L (ref 44–121)
ALT SERPL-CCNC: 17 IU/L (ref 0–32)
AST SERPL-CCNC: 16 IU/L (ref 0–40)
BASOPHILS # BLD AUTO: 0 X10E3/UL (ref 0–0.2)
BASOPHILS NFR BLD AUTO: 1 %
BILIRUB SERPL-MCNC: <0.2 MG/DL (ref 0–1.2)
BUN SERPL-MCNC: 12 MG/DL (ref 6–20)
BUN/CREAT SERPL: 14 (ref 9–23)
CALCIUM SERPL-MCNC: 9.4 MG/DL (ref 8.7–10.2)
CHLORIDE SERPL-SCNC: 102 MMOL/L (ref 96–106)
CO2 SERPL-SCNC: 18 MMOL/L (ref 20–29)
CREAT SERPL-MCNC: 0.85 MG/DL (ref 0.57–1)
CRP SERPL-MCNC: 18 MG/L (ref 0–10)
EGFR: 98 ML/MIN/1.73
EOSINOPHIL # BLD AUTO: 0.2 X10E3/UL (ref 0–0.4)
EOSINOPHIL NFR BLD AUTO: 2 %
ERYTHROCYTE [DISTWIDTH] IN BLOOD BY AUTOMATED COUNT: 15.1 % (ref 11.7–15.4)
ERYTHROCYTE [SEDIMENTATION RATE] IN BLOOD BY WESTERGREN METHOD: 49 MM/HR (ref 0–32)
GLOBULIN SER-MCNC: 2.8 G/DL (ref 1.5–4.5)
GLUCOSE SERPL-MCNC: 98 MG/DL (ref 65–99)
HCT VFR BLD AUTO: 37.4 % (ref 34–46.6)
HGB BLD-MCNC: 12.1 G/DL (ref 11.1–15.9)
IMM GRANULOCYTES # BLD: 0 X10E3/UL (ref 0–0.1)
IMM GRANULOCYTES NFR BLD: 0 %
LYMPHOCYTES # BLD AUTO: 2.7 X10E3/UL (ref 0.7–3.1)
LYMPHOCYTES NFR BLD AUTO: 36 %
MCH RBC QN AUTO: 25.8 PG (ref 26.6–33)
MCHC RBC AUTO-ENTMCNC: 32.4 G/DL (ref 31.5–35.7)
MCV RBC AUTO: 80 FL (ref 79–97)
MONOCYTES # BLD AUTO: 0.6 X10E3/UL (ref 0.1–0.9)
MONOCYTES NFR BLD AUTO: 8 %
NEUTROPHILS # BLD AUTO: 4.1 X10E3/UL (ref 1.4–7)
NEUTROPHILS NFR BLD AUTO: 53 %
PLATELET # BLD AUTO: 481 X10E3/UL (ref 150–450)
POTASSIUM SERPL-SCNC: 4.4 MMOL/L (ref 3.5–5.2)
PROT SERPL-MCNC: 7.1 G/DL (ref 6–8.5)
RBC # BLD AUTO: 4.69 X10E6/UL (ref 3.77–5.28)
SODIUM SERPL-SCNC: 138 MMOL/L (ref 134–144)
WBC # BLD AUTO: 7.6 X10E3/UL (ref 3.4–10.8)

## 2022-05-24 ENCOUNTER — ANESTHESIA (OUTPATIENT)
Dept: GASTROENTEROLOGY | Facility: AMBULATORY SURGERY CENTER | Age: 27
End: 2022-05-24

## 2022-05-24 ENCOUNTER — ANESTHESIA EVENT (OUTPATIENT)
Dept: GASTROENTEROLOGY | Facility: AMBULATORY SURGERY CENTER | Age: 27
End: 2022-05-24

## 2022-05-24 ENCOUNTER — HOSPITAL ENCOUNTER (OUTPATIENT)
Dept: GASTROENTEROLOGY | Facility: AMBULATORY SURGERY CENTER | Age: 27
Discharge: HOME | End: 2022-05-24
Attending: INTERNAL MEDICINE
Payer: COMMERCIAL

## 2022-05-24 VITALS
RESPIRATION RATE: 18 BRPM | BODY MASS INDEX: 25.61 KG/M2 | HEIGHT: 64 IN | DIASTOLIC BLOOD PRESSURE: 78 MMHG | OXYGEN SATURATION: 98 % | WEIGHT: 150 LBS | HEART RATE: 78 BPM | TEMPERATURE: 97.1 F | SYSTOLIC BLOOD PRESSURE: 124 MMHG

## 2022-05-24 DIAGNOSIS — K21.9 GASTROESOPHAGEAL REFLUX DISEASE WITHOUT ESOPHAGITIS: ICD-10-CM

## 2022-05-24 LAB
EXT PREGNANCY TEST URINE: NEGATIVE
EXT. CONTROL: NORMAL

## 2022-05-24 PROCEDURE — 43235 EGD DIAGNOSTIC BRUSH WASH: CPT | Performed by: INTERNAL MEDICINE

## 2022-05-24 RX ORDER — PROPOFOL 10 MG/ML
INJECTION, EMULSION INTRAVENOUS AS NEEDED
Status: DISCONTINUED | OUTPATIENT
Start: 2022-05-24 | End: 2022-05-24

## 2022-05-24 RX ORDER — LIDOCAINE HYDROCHLORIDE 10 MG/ML
INJECTION, SOLUTION EPIDURAL; INFILTRATION; INTRACAUDAL; PERINEURAL AS NEEDED
Status: DISCONTINUED | OUTPATIENT
Start: 2022-05-24 | End: 2022-05-24

## 2022-05-24 RX ORDER — SODIUM CHLORIDE, SODIUM LACTATE, POTASSIUM CHLORIDE, CALCIUM CHLORIDE 600; 310; 30; 20 MG/100ML; MG/100ML; MG/100ML; MG/100ML
50 INJECTION, SOLUTION INTRAVENOUS CONTINUOUS
Status: DISCONTINUED | OUTPATIENT
Start: 2022-05-24 | End: 2022-05-28 | Stop reason: HOSPADM

## 2022-05-24 RX ADMIN — SODIUM CHLORIDE, SODIUM LACTATE, POTASSIUM CHLORIDE, CALCIUM CHLORIDE 50 ML/HR: 600; 310; 30; 20 INJECTION, SOLUTION INTRAVENOUS at 07:08

## 2022-05-24 RX ADMIN — PROPOFOL 150 MG: 10 INJECTION, EMULSION INTRAVENOUS at 07:29

## 2022-05-24 RX ADMIN — PROPOFOL 50 MG: 10 INJECTION, EMULSION INTRAVENOUS at 07:31

## 2022-05-24 RX ADMIN — LIDOCAINE HYDROCHLORIDE 70 MG: 10 INJECTION, SOLUTION EPIDURAL; INFILTRATION; INTRACAUDAL; PERINEURAL at 07:29

## 2022-05-24 RX ADMIN — PROPOFOL 50 MG: 10 INJECTION, EMULSION INTRAVENOUS at 07:33

## 2022-05-24 NOTE — ANESTHESIA PREPROCEDURE EVALUATION
Procedure:  EGD    Relevant Problems   GI/HEPATIC   (+) Gastroesophageal reflux disease without esophagitis      Other   (+) Crohn's disease (HCC)        Physical Exam    Airway    Mallampati score: II  TM Distance: >3 FB  Neck ROM: full     Dental   No notable dental hx     Cardiovascular  Cardiovascular exam normal    Pulmonary  Pulmonary exam normal     Other Findings        Anesthesia Plan  ASA Score- 2     Anesthesia Type- IV sedation with anesthesia with ASA Monitors  Additional Monitors:   Airway Plan:           Plan Factors-    Chart reviewed  Patient summary reviewed  Patient is not a current smoker  Induction- intravenous  Postoperative Plan-     Informed Consent- Anesthetic plan and risks discussed with patient  I personally reviewed this patient with the CRNA  Discussed and agreed on the Anesthesia Plan with the CRNA  Christofer Liriano

## 2022-05-24 NOTE — PROGRESS NOTES
Pt given written and verbal d/c instructions  Dr Zan Ibarra reviewed results with pt which test had to be aborted r/t to food in stomach   Pt states last meal at 9 pm

## 2022-05-24 NOTE — ANESTHESIA POSTPROCEDURE EVALUATION
Post-Op Assessment Note    CV Status:  Stable  Pain Score: 0    Pain management: adequate     Mental Status:  Sleepy   Hydration Status:  Stable   PONV Controlled:  None   Airway Patency:  Patent      Post Op Vitals Reviewed: Yes      Staff: CRNA, Anesthesiologist         No complications documented      BP   116/69   Temp     Pulse   82   Resp  16   SpO2   98% on RA

## 2022-05-24 NOTE — H&P
History and Physical - [facility]    Mauricio Sam 27 y o  female MRN: [id number]      HPI: Mauricio Sam is a 27y o  year old female who presents for gerd, h/o crohns  REVIEW OF SYSTEMS: Per the HPI, and otherwise unremarkable      Historical Information     Past Medical History:   Diagnosis Date    Crohn's disease ([address] )     GERD (gastroesophageal reflux disease)     Ileitis 12/2018    seen on colo 12/18/2018     Past Surgical History:   Procedure Laterality Date    COLONOSCOPY  12/18/2018    OVARIAN CYST REMOVAL      WISDOM TOOTH EXTRACTION       Social History   Social History     Substance and Sexual Activity   Alcohol Use Yes     Social History     Substance and Sexual Activity   Drug Use Never     Social History     Tobacco Use   Smoking Status Never Smoker   Smokeless Tobacco Never Used     Family History   Problem Relation Age of Onset    Hypertension Mother     Colon polyps Mother         per GI notes    Colon polyps Father     Hypertension Father     Diabetes Father     MAY disease Father     MAY disease Brother     Colon polyps Maternal Uncle     Crohn's disease Cousin     Colorectal Cancer Neg Hx        Meds/Allergies       Current Outpatient Medications:     cholecalciferol (VITAMIN D3) 1,000 units tablet    Junel FE 1/20 1-20 MG-MCG per tablet    LORazepam (ATIVAN) 1 mg tablet    mesalamine (LIALDA) 1 2 g EC tablet    cholestyramine (QUESTRAN) 4 g packet    Current Facility-Administered Medications:     lactated ringers infusion, 50 mL/hr, Intravenous, Continuous, Continue from Pre-op at 05/24/22 0713    No Known Allergies    Objective     /86   Pulse 94   Temp (!) 97 1 °F (36 2 °C) (Temporal)   Resp (!) 24   Ht 5' 4" (1 626 m)   Wt 68 kg (150 lb)   LMP 05/16/2022 (Exact Date)   SpO2 97%   BMI 25 75 kg/m²       PHYSICAL EXAM    Gen: NAD AAOx3  Head: Normocephalic, Atraumatic  CV: S1S2 RRR no m/r/g  CHEST: Clear b/l no c/r/w  ABD: soft, +BS NT/ND no masses  EXT: no edema      ASSESSMENT/PLAN:  This is a 27y o  year old female here for egd, and she is stable and optimized for her procedure

## 2022-05-31 ENCOUNTER — TELEPHONE (OUTPATIENT)
Dept: GASTROENTEROLOGY | Facility: CLINIC | Age: 27
End: 2022-05-31

## 2022-05-31 NOTE — TELEPHONE ENCOUNTER
EGD rescheduled for 6/17- per Dr Maurer's 5/24 report pt needs extended fasting as there was food retained in stomach  Sending to MA to clarify and let pt know   Thank you

## 2022-06-06 NOTE — TELEPHONE ENCOUNTER
New EGD instructions reviewed with extended prep  Clear liquids day prior to EGD and NPO after 9pm  Instruction sheet e-mailed to pt

## 2022-06-17 ENCOUNTER — ANESTHESIA EVENT (OUTPATIENT)
Dept: GASTROENTEROLOGY | Facility: AMBULATORY SURGERY CENTER | Age: 27
End: 2022-06-17

## 2022-06-17 ENCOUNTER — ANESTHESIA (OUTPATIENT)
Dept: GASTROENTEROLOGY | Facility: AMBULATORY SURGERY CENTER | Age: 27
End: 2022-06-17

## 2022-06-17 ENCOUNTER — HOSPITAL ENCOUNTER (OUTPATIENT)
Dept: GASTROENTEROLOGY | Facility: AMBULATORY SURGERY CENTER | Age: 27
Discharge: HOME | End: 2022-06-17
Attending: INTERNAL MEDICINE
Payer: COMMERCIAL

## 2022-06-17 VITALS
SYSTOLIC BLOOD PRESSURE: 126 MMHG | TEMPERATURE: 97.7 F | RESPIRATION RATE: 21 BRPM | HEART RATE: 73 BPM | OXYGEN SATURATION: 96 % | WEIGHT: 145 LBS | DIASTOLIC BLOOD PRESSURE: 86 MMHG | HEIGHT: 64 IN | BODY MASS INDEX: 24.75 KG/M2

## 2022-06-17 DIAGNOSIS — K25.7 CHRONIC GASTRIC ULCER WITHOUT HEMORRHAGE AND WITHOUT PERFORATION: Primary | ICD-10-CM

## 2022-06-17 DIAGNOSIS — K25.7 CHRONIC GASTRIC ULCER WITHOUT HEMORRHAGE AND WITHOUT PERFORATION: ICD-10-CM

## 2022-06-17 DIAGNOSIS — K21.9 GASTROESOPHAGEAL REFLUX DISEASE WITHOUT ESOPHAGITIS: ICD-10-CM

## 2022-06-17 LAB
EXT PREGNANCY TEST URINE: NEGATIVE
EXT. CONTROL: NORMAL

## 2022-06-17 PROCEDURE — 88305 TISSUE EXAM BY PATHOLOGIST: CPT | Performed by: PATHOLOGY

## 2022-06-17 PROCEDURE — 43239 EGD BIOPSY SINGLE/MULTIPLE: CPT | Performed by: INTERNAL MEDICINE

## 2022-06-17 RX ORDER — SODIUM CHLORIDE, SODIUM LACTATE, POTASSIUM CHLORIDE, CALCIUM CHLORIDE 600; 310; 30; 20 MG/100ML; MG/100ML; MG/100ML; MG/100ML
50 INJECTION, SOLUTION INTRAVENOUS CONTINUOUS
Status: DISCONTINUED | OUTPATIENT
Start: 2022-06-17 | End: 2022-06-21 | Stop reason: HOSPADM

## 2022-06-17 RX ORDER — PROPOFOL 10 MG/ML
INJECTION, EMULSION INTRAVENOUS AS NEEDED
Status: DISCONTINUED | OUTPATIENT
Start: 2022-06-17 | End: 2022-06-17

## 2022-06-17 RX ORDER — LIDOCAINE HYDROCHLORIDE 10 MG/ML
INJECTION, SOLUTION EPIDURAL; INFILTRATION; INTRACAUDAL; PERINEURAL AS NEEDED
Status: DISCONTINUED | OUTPATIENT
Start: 2022-06-17 | End: 2022-06-17

## 2022-06-17 RX ORDER — OMEPRAZOLE 40 MG/1
40 CAPSULE, DELAYED RELEASE ORAL DAILY
Qty: 30 CAPSULE | Refills: 2 | Status: SHIPPED | OUTPATIENT
Start: 2022-06-17 | End: 2022-06-17

## 2022-06-17 RX ORDER — OMEPRAZOLE 40 MG/1
40 CAPSULE, DELAYED RELEASE ORAL DAILY
Qty: 30 CAPSULE | Refills: 2 | Status: SHIPPED | OUTPATIENT
Start: 2022-06-17 | End: 2022-06-20 | Stop reason: SDUPTHER

## 2022-06-17 RX ADMIN — PROPOFOL 50 MG: 10 INJECTION, EMULSION INTRAVENOUS at 13:05

## 2022-06-17 RX ADMIN — LIDOCAINE HYDROCHLORIDE 50 MG: 10 INJECTION, SOLUTION EPIDURAL; INFILTRATION; INTRACAUDAL; PERINEURAL at 13:01

## 2022-06-17 RX ADMIN — PROPOFOL 150 MG: 10 INJECTION, EMULSION INTRAVENOUS at 13:01

## 2022-06-17 RX ADMIN — SODIUM CHLORIDE, SODIUM LACTATE, POTASSIUM CHLORIDE, CALCIUM CHLORIDE 50 ML/HR: 600; 310; 30; 20 INJECTION, SOLUTION INTRAVENOUS at 12:58

## 2022-06-17 RX ADMIN — PROPOFOL 50 MG: 10 INJECTION, EMULSION INTRAVENOUS at 13:07

## 2022-06-17 RX ADMIN — PROPOFOL 50 MG: 10 INJECTION, EMULSION INTRAVENOUS at 13:03

## 2022-06-17 NOTE — H&P
History and Physical - [facility]    Nina Robbins 27 y o  female MRN: [id number]      HPI: Nina Robbins is a 27y o  year old female who presents for GERD  REVIEW OF SYSTEMS: Per the HPI, and otherwise unremarkable      Historical Information     Past Medical History:   Diagnosis Date    Crohn's disease ([address] )     GERD (gastroesophageal reflux disease)     Ileitis 12/2018    seen on colo 12/18/2018     Past Surgical History:   Procedure Laterality Date    COLONOSCOPY  12/18/2018    OVARIAN CYST REMOVAL      WISDOM TOOTH EXTRACTION       Social History   Social History     Substance and Sexual Activity   Alcohol Use Yes     Social History     Substance and Sexual Activity   Drug Use Never     Social History     Tobacco Use   Smoking Status Never Smoker   Smokeless Tobacco Never Used     Family History   Problem Relation Age of Onset    Hypertension Mother     Colon polyps Mother         per GI notes    Colon polyps Father     Hypertension Father     Diabetes Father     MAY disease Father     MAY disease Brother     Colon polyps Maternal Uncle     Crohn's disease Cousin     Colorectal Cancer Neg Hx        Meds/Allergies       Current Outpatient Medications:     cholecalciferol (VITAMIN D3) 1,000 units tablet    cholestyramine (QUESTRAN) 4 g packet    Junel FE 1/20 1-20 MG-MCG per tablet    LORazepam (ATIVAN) 1 mg tablet    mesalamine (LIALDA) 1 2 g EC tablet    Current Facility-Administered Medications:     lactated ringers infusion, 50 mL/hr, Intravenous, Continuous    No Known Allergies    Objective     /89   Pulse 82   Temp 97 7 °F (36 5 °C) (Temporal)   Resp 14   Ht 5' 4" (1 626 m)   Wt 65 8 kg (145 lb)   LMP 06/13/2022 (Exact Date)   SpO2 96%   BMI 24 89 kg/m²       PHYSICAL EXAM    Gen: NAD AAOx3  Head: Normocephalic, Atraumatic  CV: S1S2 RRR no m/r/g  CHEST: Clear b/l no c/r/w  ABD: soft, +BS NT/ND no masses  EXT: no edema      ASSESSMENT/PLAN:  This is a 27y o  year old female here for EGD, and she is stable and optimized for her procedure

## 2022-06-17 NOTE — ANESTHESIA POSTPROCEDURE EVALUATION
Post-Op Assessment Note    CV Status:  Stable  Pain Score: 0    Pain management: adequate     Mental Status:  Alert and awake   Hydration Status:  Euvolemic   PONV Controlled:  Controlled   Airway Patency:  Patent      Post Op Vitals Reviewed: Yes      Staff: Anesthesiologist, CRNA         No complications documented      BP   49351   Temp      Pulse  79   Resp   16   SpO2   97

## 2022-06-17 NOTE — ANESTHESIA PREPROCEDURE EVALUATION
Procedure:  EGD    Relevant Problems   GI/HEPATIC   (+) Gastroesophageal reflux disease without esophagitis      Other   (+) Crohn's disease (HCC)        Physical Exam    Airway    Mallampati score: II  TM Distance: >3 FB  Neck ROM: full     Dental   No notable dental hx     Cardiovascular  Cardiovascular exam normal    Pulmonary  Pulmonary exam normal     Other Findings        Anesthesia Plan  ASA Score- 2     Anesthesia Type- IV sedation with anesthesia with ASA Monitors  Additional Monitors:   Airway Plan:           Plan Factors-Exercise tolerance (METS): >4 METS  Chart reviewed  Patient summary reviewed  Patient is not a current smoker  Induction- intravenous  Postoperative Plan-     Informed Consent- Anesthetic plan and risks discussed with patient  I personally reviewed this patient with the CRNA  Discussed and agreed on the Anesthesia Plan with the CRNA  Nicole Malloy

## 2022-06-20 DIAGNOSIS — K25.7 CHRONIC GASTRIC ULCER WITHOUT HEMORRHAGE AND WITHOUT PERFORATION: ICD-10-CM

## 2022-06-20 NOTE — TELEPHONE ENCOUNTER
Pt left  mssg stating Ins will not cover Omeprazole at [facility]; asks for new script to [facility]  If ques CB# [phone]  Added new pharm

## 2022-06-20 NOTE — TELEPHONE ENCOUNTER
Pt left  mssg stating she didn't want to change med/just different pharmacy; would like Rx to go to [facility] in [place]  If ques # [phone]

## 2022-06-21 RX ORDER — OMEPRAZOLE 40 MG/1
40 CAPSULE, DELAYED RELEASE ORAL DAILY
Qty: 30 CAPSULE | Refills: 2 | Status: SHIPPED | OUTPATIENT
Start: 2022-06-21

## 2022-08-23 ENCOUNTER — TELEPHONE (OUTPATIENT)
Dept: GASTROENTEROLOGY | Facility: CLINIC | Age: 27
End: 2022-08-23

## 2022-08-23 NOTE — TELEPHONE ENCOUNTER
Patients GI provider:  Dr Belkys andrade    Number to return call: [phone]    Reason for call: Pt calling to schedule her repeat EGD  Above is her number       Scheduled procedure/appointment date if applicable: Apt/procedure NA

## 2022-08-31 ENCOUNTER — TELEPHONE (OUTPATIENT)
Dept: GASTROENTEROLOGY | Facility: CLINIC | Age: 27
End: 2022-08-31

## 2022-08-31 ENCOUNTER — TELEPHONE (OUTPATIENT)
Dept: GASTROENTEROLOGY | Facility: AMBULARY SURGERY CENTER | Age: 27
End: 2022-08-31

## 2022-08-31 DIAGNOSIS — K50.019 CROHN'S DISEASE OF SMALL INTESTINE WITH COMPLICATION (HCC): ICD-10-CM

## 2022-08-31 RX ORDER — MESALAMINE 1.2 G/1
2.4 TABLET, DELAYED RELEASE ORAL DAILY
Qty: 60 TABLET | Refills: 11 | Status: SHIPPED | OUTPATIENT
Start: 2022-08-31 | End: 2022-10-21

## 2022-08-31 NOTE — TELEPHONE ENCOUNTER
Judi Sherwood  to Rajeev Ozuna MD    AB      8/31/22 11:38 AM  Hello, thank you so much! Yes that is correct! Me  to Judi Sherwood    TM      8/31/22 11:34 AM  Rickie Cleveland,  I am one of Dr Saritha Zavala  I can gladly refill your medication  I just want to confirm that you're still taking mesalamine 1 2 gm tablets  Take (2) tablets by mouth daily  You want this to go to [facility]/pharmacy #[id number]- [address]    30 day supply? Thanks,   Richard Roberts RN

## 2022-08-31 NOTE — TELEPHONE ENCOUNTER
----- Message from Joe Guevara sent at 8/31/2022 11:07 AM EDT -----  Regarding: Mesalamine Refill  Hi Dr BONILLA [facility],    I hope this message finds you well  I was wondering if I could get a refill on my mesalamine prescription? I wasnt sure if this required an appointment or not      Thank you,  Joe Guevara

## 2022-08-31 NOTE — TELEPHONE ENCOUNTER
Patients GI provider:  Dr BONILLA [facility]     Number to return call: [phone]     Reason for call: Pt calling to schedule repeat EGD       Scheduled procedure/appointment date if applicable: Apt/procedure n/a

## 2022-09-07 ENCOUNTER — TELEPHONE (OUTPATIENT)
Dept: GASTROENTEROLOGY | Facility: CLINIC | Age: 27
End: 2022-09-07

## 2022-09-07 NOTE — TELEPHONE ENCOUNTER
Scheduled date of EGD(as of today):10/21/2022  Physician performing EGD:Dr Belkys andrade  Location of EGD:[facility]  Instructions reviewed with patient by:kris  Clearances: none

## 2022-10-19 ENCOUNTER — TELEPHONE (OUTPATIENT)
Dept: GASTROENTEROLOGY | Facility: AMBULATORY SURGERY CENTER | Age: 27
End: 2022-10-19

## 2022-10-21 ENCOUNTER — ANESTHESIA (OUTPATIENT)
Dept: GASTROENTEROLOGY | Facility: AMBULATORY SURGERY CENTER | Age: 27
End: 2022-10-21

## 2022-10-21 ENCOUNTER — ANESTHESIA EVENT (OUTPATIENT)
Dept: GASTROENTEROLOGY | Facility: AMBULATORY SURGERY CENTER | Age: 27
End: 2022-10-21

## 2022-10-21 ENCOUNTER — HOSPITAL ENCOUNTER (OUTPATIENT)
Dept: GASTROENTEROLOGY | Facility: AMBULATORY SURGERY CENTER | Age: 27
Discharge: HOME | End: 2022-10-21
Attending: INTERNAL MEDICINE

## 2022-10-21 VITALS
BODY MASS INDEX: 24.75 KG/M2 | DIASTOLIC BLOOD PRESSURE: 61 MMHG | SYSTOLIC BLOOD PRESSURE: 138 MMHG | OXYGEN SATURATION: 98 % | WEIGHT: 145 LBS | RESPIRATION RATE: 21 BRPM | TEMPERATURE: 98.6 F | HEIGHT: 64 IN | HEART RATE: 69 BPM

## 2022-10-21 DIAGNOSIS — K25.7 CHRONIC GASTRIC ULCER WITHOUT HEMORRHAGE AND WITHOUT PERFORATION: ICD-10-CM

## 2022-10-21 LAB
EXT PREGNANCY TEST URINE: NEGATIVE
EXT. CONTROL: NORMAL

## 2022-10-21 RX ORDER — SODIUM CHLORIDE 9 MG/ML
50 INJECTION, SOLUTION INTRAVENOUS CONTINUOUS
Status: DISCONTINUED | OUTPATIENT
Start: 2022-10-21 | End: 2022-10-25 | Stop reason: HOSPADM

## 2022-10-21 RX ORDER — OMEPRAZOLE 40 MG/1
40 CAPSULE, DELAYED RELEASE ORAL DAILY
Qty: 30 CAPSULE | Refills: 11 | Status: SHIPPED | OUTPATIENT
Start: 2022-10-21

## 2022-10-21 RX ORDER — PROPOFOL 10 MG/ML
INJECTION, EMULSION INTRAVENOUS AS NEEDED
Status: DISCONTINUED | OUTPATIENT
Start: 2022-10-21 | End: 2022-10-21

## 2022-10-21 RX ADMIN — PROPOFOL 50 MG: 10 INJECTION, EMULSION INTRAVENOUS at 09:00

## 2022-10-21 RX ADMIN — SODIUM CHLORIDE 50 ML/HR: 9 INJECTION, SOLUTION INTRAVENOUS at 08:43

## 2022-10-21 RX ADMIN — PROPOFOL 50 MG: 10 INJECTION, EMULSION INTRAVENOUS at 08:59

## 2022-10-21 RX ADMIN — PROPOFOL 150 MG: 10 INJECTION, EMULSION INTRAVENOUS at 08:58

## 2022-10-21 NOTE — ANESTHESIA POSTPROCEDURE EVALUATION
Post-Op Assessment Note    CV Status:  Stable  Pain Score: 0    Pain management: adequate     Mental Status:  Sleepy   Hydration Status:  Euvolemic   PONV Controlled:  Controlled   Airway Patency:  Patent      Post Op Vitals Reviewed: Yes      Staff: Anesthesiologist, CRNA         No complications documented      BP   125/89   Temp      Pulse  85   Resp  18   SpO2   97

## 2022-10-21 NOTE — H&P
History and Physical - [facility]    Maria Esther Zhang 24 y o  female MRN: [id number]      HPI: Maria Esther Zhang is a 24y o  year old female who presents for f/u of gastric ulcer  REVIEW OF SYSTEMS: Per the HPI, and otherwise unremarkable      Historical Information     Past Medical History:   Diagnosis Date   • Crohn's disease ([address] )    • GERD (gastroesophageal reflux disease)    • Ileitis 12/2018    seen on colo 12/18/2018     Past Surgical History:   Procedure Laterality Date   • COLONOSCOPY  12/18/2018   • OVARIAN CYST REMOVAL     • WISDOM TOOTH EXTRACTION       Social History   Social History     Substance and Sexual Activity   Alcohol Use Yes     Social History     Substance and Sexual Activity   Drug Use Never     Social History     Tobacco Use   Smoking Status Never Smoker   Smokeless Tobacco Never Used     Family History   Problem Relation Age of Onset   • Hypertension Mother    • Colon polyps Mother         per GI notes   • Colon polyps Father    • Hypertension Father    • Diabetes Father    • MAY disease Father    • MAY disease Brother    • Colon polyps Maternal Uncle    • Crohn's disease Cousin    • Colorectal Cancer Neg Hx        Meds/Allergies       Current Outpatient Medications:   •  Junel FE 1/20 1-20 MG-MCG per tablet  •  LORazepam (ATIVAN) 1 mg tablet  •  mesalamine (LIALDA) 1 2 g EC tablet  •  omeprazole (PriLOSEC) 40 MG capsule  •  cholecalciferol (VITAMIN D3) 1,000 units tablet  •  cholestyramine (QUESTRAN) 4 g packet    Current Facility-Administered Medications:   •  sodium chloride 0 9 % infusion, 50 mL/hr, Intravenous, Continuous, 50 mL/hr at 10/21/22 0843    No Known Allergies    Objective     /83   Pulse 82   Temp 98 6 °F (37 °C) (Temporal)   Resp (!) 11   Ht 5' 4" (1 626 m)   Wt 65 8 kg (145 lb)   LMP 09/28/2022 (Approximate)   SpO2 95%   BMI 24 89 kg/m²       PHYSICAL EXAM    Gen: NAD AAOx3  Head: Normocephalic, Atraumatic  CV: S1S2 RRR no m/r/g  CHEST: Clear b/l no c/r/w  ABD: soft, +BS NT/ND no masses  EXT: no edema      ASSESSMENT/PLAN:  This is a 24y o  year old female here for EGD, and she is stable and optimized for her procedure

## 2022-10-21 NOTE — ANESTHESIA PREPROCEDURE EVALUATION
Procedure:  EGD    Relevant Problems   ANESTHESIA (within normal limits)      GI/HEPATIC   (+) Gastroesophageal reflux disease without esophagitis      Other   (+) Crohn's disease (HCC)        Physical Exam    Airway    Mallampati score: III  TM Distance: >3 FB  Neck ROM: full     Dental   No notable dental hx     Cardiovascular  Cardiovascular exam normal    Pulmonary  Pulmonary exam normal     Other Findings        Anesthesia Plan  ASA Score- 2     Anesthesia Type- IV sedation with anesthesia with ASA Monitors  Additional Monitors:   Airway Plan:     Comment: I discussed risks (reviewed with patient on the anesthesia consent form), benefits and alternatives of monitored sedation including the possibility under sedation to have recall or mild discomfort          Plan Factors-    Chart reviewed  Patient summary reviewed  Induction- intravenous  Postoperative Plan-     Informed Consent- Anesthetic plan and risks discussed with patient  I personally reviewed this patient with the CRNA  Discussed and agreed on the Anesthesia Plan with the CRNA  Babatunde Bennett

## 2023-01-23 ENCOUNTER — TELEPHONE (OUTPATIENT)
Dept: GASTROENTEROLOGY | Facility: AMBULARY SURGERY CENTER | Age: 28
End: 2023-01-23

## 2023-01-23 DIAGNOSIS — K50.019 CROHN'S DISEASE OF SMALL INTESTINE WITH COMPLICATION (HCC): ICD-10-CM

## 2023-01-23 DIAGNOSIS — K25.7 CHRONIC GASTRIC ULCER WITHOUT HEMORRHAGE AND WITHOUT PERFORATION: ICD-10-CM

## 2023-01-23 RX ORDER — MESALAMINE 1.2 G/1
2.4 TABLET, DELAYED RELEASE ORAL DAILY
Qty: 60 TABLET | Refills: 11 | Status: SHIPPED | OUTPATIENT
Start: 2023-01-23 | End: 2023-02-07

## 2023-01-23 RX ORDER — OMEPRAZOLE 40 MG/1
40 CAPSULE, DELAYED RELEASE ORAL DAILY
Qty: 30 CAPSULE | Refills: 11 | Status: SHIPPED | OUTPATIENT
Start: 2023-01-23

## 2023-01-23 NOTE — TELEPHONE ENCOUNTER
Patients GI provider:  Dr BONILLA [facility]     Number to return call: [phone]     Reason for call: Pt calling requesting to speak with someone to go over results of EGD and to discuss repeat procedure       Scheduled procedure/appointment date if applicable: Apt/procedure n/a

## 2023-01-23 NOTE — TELEPHONE ENCOUNTER
I called and spoke with patient, I refilled her omeprazole to [facility] and mesalamine to [facility]  She is questioning a colon recall? Hx crohn's  Last colon was 2018  I see mention of a colon recall for December 2023  Last colon 12/18/2023  Patient was under the impression she is to schedule sooner? Confirmed no EGD recall and colon recall is December 2023  I offered to schedule a follow up appointment with Dr Nayely Frausto in April, patient declined  She is awaiting her April work schedule  She will callback to make an appointment        Patient callback: [phone]

## 2023-02-06 DIAGNOSIS — K50.019 CROHN'S DISEASE OF SMALL INTESTINE WITH COMPLICATION (HCC): ICD-10-CM

## 2023-02-07 RX ORDER — MESALAMINE 1.2 G/1
TABLET, DELAYED RELEASE ORAL
Qty: 180 TABLET | Refills: 0 | Status: SHIPPED | OUTPATIENT
Start: 2023-02-07

## 2023-02-07 NOTE — TELEPHONE ENCOUNTER
Spoke to the patient and she is on the wait list for an appt with Dr Kassandra Langston in April but will make an appt in May if nothing opens up

## 2023-05-16 DIAGNOSIS — K50.019 CROHN'S DISEASE OF SMALL INTESTINE WITH COMPLICATION (HCC): ICD-10-CM

## 2023-05-17 RX ORDER — MESALAMINE 1.2 G/1
TABLET, DELAYED RELEASE ORAL
Qty: 180 TABLET | Refills: 0 | Status: SHIPPED | OUTPATIENT
Start: 2023-05-17

## 2023-05-17 NOTE — TELEPHONE ENCOUNTER
Please ask the patient make an office visit   Patient needs labs prior to office visit: CBC, CMP, CRP, fecal calprotectin, vitamin D

## 2023-08-25 DIAGNOSIS — K50.019 CROHN'S DISEASE OF SMALL INTESTINE WITH COMPLICATION (HCC): ICD-10-CM

## 2023-08-25 RX ORDER — MESALAMINE 1.2 G/1
2.4 TABLET, DELAYED RELEASE ORAL DAILY
Qty: 180 TABLET | Refills: 0 | Status: SHIPPED | OUTPATIENT
Start: 2023-08-25 | End: 2023-09-12 | Stop reason: SDUPTHER

## 2023-08-25 RX ORDER — MESALAMINE 1.2 G/1
TABLET, DELAYED RELEASE ORAL
Qty: 180 TABLET | Refills: 0 | OUTPATIENT
Start: 2023-08-25

## 2023-08-25 NOTE — TELEPHONE ENCOUNTER
Patient follows with Dr. Cassia Amato. Forward message to provider pool and clinical pool in [facility].

## 2023-09-12 ENCOUNTER — TELEPHONE (OUTPATIENT)
Dept: GASTROENTEROLOGY | Facility: CLINIC | Age: 28
End: 2023-09-12

## 2023-09-12 ENCOUNTER — OFFICE VISIT (OUTPATIENT)
Dept: GASTROENTEROLOGY | Facility: CLINIC | Age: 28
End: 2023-09-12
Payer: COMMERCIAL

## 2023-09-12 VITALS
HEIGHT: 64 IN | BODY MASS INDEX: 33.63 KG/M2 | DIASTOLIC BLOOD PRESSURE: 80 MMHG | WEIGHT: 197 LBS | SYSTOLIC BLOOD PRESSURE: 140 MMHG

## 2023-09-12 DIAGNOSIS — K21.9 GASTROESOPHAGEAL REFLUX DISEASE WITHOUT ESOPHAGITIS: ICD-10-CM

## 2023-09-12 DIAGNOSIS — E55.9 VITAMIN D DEFICIENCY: ICD-10-CM

## 2023-09-12 DIAGNOSIS — K50.019 CROHN'S DISEASE OF SMALL INTESTINE WITH COMPLICATION (HCC): Primary | ICD-10-CM

## 2023-09-12 DIAGNOSIS — K25.7 CHRONIC GASTRIC ULCER WITHOUT HEMORRHAGE AND WITHOUT PERFORATION: ICD-10-CM

## 2023-09-12 PROCEDURE — 99214 OFFICE O/P EST MOD 30 MIN: CPT | Performed by: PHYSICIAN ASSISTANT

## 2023-09-12 RX ORDER — POLYETHYLENE GLYCOL 3350, SODIUM SULFATE ANHYDROUS, SODIUM BICARBONATE, SODIUM CHLORIDE, POTASSIUM CHLORIDE 236; 22.74; 6.74; 5.86; 2.97 G/4L; G/4L; G/4L; G/4L; G/4L
4000 POWDER, FOR SOLUTION ORAL ONCE
Qty: 4000 ML | Refills: 0 | Status: SHIPPED | OUTPATIENT
Start: 2023-09-12 | End: 2023-09-12

## 2023-09-12 RX ORDER — OMEPRAZOLE 40 MG/1
40 CAPSULE, DELAYED RELEASE ORAL DAILY
Qty: 30 CAPSULE | Refills: 5 | Status: SHIPPED | OUTPATIENT
Start: 2023-09-12

## 2023-09-12 RX ORDER — MELATONIN
2000 DAILY
Qty: 180 TABLET | Refills: 1 | Status: SHIPPED | OUTPATIENT
Start: 2023-09-12

## 2023-09-12 RX ORDER — MESALAMINE 1.2 G/1
2.4 TABLET, DELAYED RELEASE ORAL DAILY
Qty: 180 TABLET | Refills: 1 | Status: SHIPPED | OUTPATIENT
Start: 2023-09-12

## 2023-09-12 NOTE — PROGRESS NOTES
[facility] - Outpatient Follow-up Note  Fernando Rooney 34 y.o. female MRN: [id number]  Encounter: [id number]    ASSESSMENT AND PLAN:    1. Crohn's disease of small intestine with complication ([address])  Well-controlled and appears to be in clinical remission on Lialda 2.4 g daily. Rarely gets abdominal pain and diarrhea when stressed improved with Questran as needed. She is due for colonoscopy for dysplasia surveillance. I reviewed indications risk benefits and alternatives of colonoscopy and she is willing to proceed. She is also due for updated labs which I will order now. Patient advised to have yearly dermatologic exams and regular GYN exams given increased risk of skin and gynecologic cancer. She was advised to be up-to-date on adult vaccinations and have regular DEXA scans. We reviewed the natural pathogenesis of Crohn's disease and increased risk of complications including colon cancer if uncontrolled. - Colonoscopy; Future  - polyethylene glycol (Golytely) 4000 mL solution; Take 4,000 mL by mouth once for 1 dose Take 4000 mL by mouth once for 1 dose. Use as directed  Dispense: 4000 mL; Refill: 0  - CBC; Future  - Comprehensive metabolic panel; Future  - C-reactive protein; Future  - mesalamine (LIALDA) 1.2 g EC tablet; Take 2 tablets (2.4 g total) by mouth daily  Dispense: 180 tablet; Refill: 1    2. Vitamin D deficiency  History of vitamin D deficiency on oral supplementation. We will recheck level now. - Vitamin D 25 hydroxy  - cholecalciferol (VITAMIN D3) 1,000 units tablet; Take 2 tablets (2,000 Units total) by mouth daily  Dispense: 180 tablet; Refill: 1    3. Gastroesophageal reflux disease without esophagitis  4. Chronic gastric ulcer without hemorrhage and without perforation  Reflux well controlled with daily PPI. She had evidence of gastric ulcer on endoscopy June 2022 healed on endoscopy 10/2022. No evidence of HP/IM.   An antireflux regimen and lifestyle modifications were reviewed. Patient advised to avoid NSAIDs. - omeprazole (PriLOSEC) 40 MG capsule; Take 1 capsule (40 mg total) by mouth daily  Dispense: 30 capsule; Refill: 5        Follow up appointment: colon 12/2023, OV 1 year  ______________________________________________________________________    Chief Complaint   Patient presents with   • Follow-up     Occasional; flare-ups but nothing too much, pt is feeling good now       HPI:     Patient is a 34 y.o. female with  Crohn's ilieitis, Vit D def and reflux being evaluated in follow-up. Crohn's disease diagnosed in 2018 when pt had abd pain and bloody diarrhea, with negative follow-through in 2019. Last evaluated via telemedicine by Dr. Loomis February 2022 for follow-up of her Crohn's ileitis doing well on Lialda 2.4g daily and Questran as needed. She had intermittent reflux improved with Pepcid. Labs and endoscopy ordered. Endoscopy June 20222 showed multiple cratered ulcers in the antrum and duodenum, small bowel and gastric biopsy unremarkable patient started on PPI. Endoscopy October 2022 showed small superficial duodenal ulcers. Gastric biopsies neg HP/IM. Last colonoscopy December 2018 with good prep with localized ulceration granularity friability erythema of the ileocecal valve and terminal ileum and internal hemorrhoids. terminal ileal biopsies showed chronic inactive inflammation with architectural changes. rec repeat 5 years. (M, F colon polyps)    4/2022 CBC, CMP WNL. ESR and CRP mildly elevated . Vit D low. I am happy to hear patient feeling well on Lialda 2.4 g daily having formed stools daily without bleeding. Rarely when stressed she develops diarrhea and abdominal pain but has not needed the Questran. Reflux remains well controlled with Prilosec 40 mg daily,  she gets rare breakthrough symptoms she denies NSAID use. She is eating well and weight is stable.   She remains on vitamin D supplementation daily. Denies extraintestinal IBD manifestations    Pt denies all other GI and constitutional symptoms. ROS:   Constitutional: denies fatigue, fever. HEENT: denies visual disturbance, postnasal drip, sore throat. Respiratory: denies cough, shortness of breath. Cardiovascular: denies chest pain, leg swelling. Gastrointestinal: as noted above in HPI. : denies difficulty urinating, dysuria. Musculoskeletal: denies arthralgias, back pain. Neurological: denies dizziness, syncope. Psychiatric: denies confusion, anxiety.     Historical Information   Past Medical History:   Diagnosis Date   • Crohn's disease ([address])    • Duodenal ulcer    • Gastric ulcer    • GERD (gastroesophageal reflux disease)    • Ileitis 12/2018    seen on colo 12/18/2018   • Lactose intolerance Unknown     Past Surgical History:   Procedure Laterality Date   • ABDOMINAL SURGERY      Ovarian cyst removal   • COLONOSCOPY  12/18/2018   • COLONOSCOPY  12/2018   • OVARIAN CYST REMOVAL     • WISDOM TOOTH EXTRACTION       Social History     Substance and Sexual Activity   Alcohol Use Yes   • Alcohol/week: 1.0 standard drink of alcohol   • Types: 1 Standard drinks or equivalent per week     Social History     Substance and Sexual Activity   Drug Use Never     Social History     Tobacco Use   Smoking Status Never   Smokeless Tobacco Never     Family History   Problem Relation Age of Onset   • Hypertension Mother    • Colon polyps Mother         per GI notes   • Arthritis Mother    • Hyperlipidemia Mother    • Irritable bowel syndrome Mother    • Colon polyps Father    • Hypertension Father    • Diabetes Father    • AMY disease Father    • Hyperlipidemia Father    • MAY disease Brother    • Colon polyps Maternal Uncle    • Crohn's disease Cousin    • Cancer Maternal Grandfather    • Irritable bowel syndrome Maternal Grandfather    • Breast cancer Maternal Grandmother    • Diabetes Paternal Grandfather    • Colorectal Cancer Neg Hx          Current Outpatient Medications:   •  cholecalciferol (VITAMIN D3) 1,000 units tablet  •  cholestyramine (QUESTRAN) 4 g packet  •  Junel FE 1/20 1-20 MG-MCG per tablet  •  LORazepam (ATIVAN) 1 mg tablet  •  mesalamine (LIALDA) 1.2 g EC tablet  •  omeprazole (PriLOSEC) 40 MG capsule  •  polyethylene glycol (Golytely) 4000 mL solution  No Known Allergies  Reviewed medications and allergies and updated as indicated    PHYSICAL EXAM:    Blood pressure 140/80, height 5' 4" (1.626 m), weight 89.4 kg (197 lb). Body mass index is 33.81 kg/m². Constitutional: Well-developed, no acute distress  HEENT: normocephalic, mucous membranes moist., wears glasses  Neck: Supple  Skin: warm and dry  Respiratory: Lungs are clear to auscultation B/L. Cardiovascular: Heart is regular rate and rhythm. Gastrointestinal: protuberant, Soft, nontender, nondistended with normal active bowel sounds. No masses, guarding, rebound. Rectal Exam: Deferred. Integumentary: Warm and dry  Extremities: No edema. Neurologic: Nonfocal. A & O ×3. Psychiatric: Normal affect. Lab Results:   As above    Radiology Results:   No results found. Patient expressed understanding and had all questions and concerns addressed. Advised patient to call with any questions, failure to improve, or if symptoms worsen. Daya Malhotra PA-C  09/12/23  3:50 PM    This chart was completed in part utilizing [employer] speech voice recognition software. Random word insertions, pronoun errors, and incomplete sentences are an occasional consequence of this system due to software limitations, and ambient noise. Any questions or concerns about the content, text, or information contained within the body of this dictation should be directly addressed to the provider for clarification.

## 2023-09-12 NOTE — TELEPHONE ENCOUNTER
Scheduled date of colonoscopy (as of today):1/17/23  Physician performing colonoscopy:REECE  Location of colonoscopy:[facility]  Bowel prep reviewed with patient:Umer  Instructions reviewed with patient by:MATTHIAS  Clearances: N

## 2023-09-12 NOTE — PATIENT INSTRUCTIONS
-Continue mesalamine, Questran as needed, omeprazole  -Get labs and colonoscopy  -Yearly dermatologic exam be up-to-date on GYN exam  -Up-to-date on adult vaccines ,speak with PCP about need for DEXA scan  -Follow-up 1 year with Dr. Hubert Loera call sooner with any problems    IBD Healthcare maintenance recommendations:     Vaccines and infections  1) avoid live vaccines  2) yearly flu shot  3) Prevnar 13, followed by Pneumovax at least 8 weeks later (and a Pneumovax booster 5 years after)  4) Shingrix  5) Quantiferon prior to immunosuppression and then annually  6) MMR prior to biologic  7) varicella prior to biologic  8) HPV vaccination as per national guidelines  9) hepatitis a and B vaccinations if not previously vaccinated     Cancer screening  1) dysplasia surveillance for colorectal cancer  2) Pap smears (especially for woman on immunosuppression)  3) annual dermatologic/skin exam     Miscellaneous  1) screening for osteoporosis  2) smoking screen in counseling  3) depression screen annually        Health Maintenance recomendations guidelines based on [facility] recommendations     Vaccines  1) Influenza vaccine ("Flu shot") - Dead virus- annually is suggested   2 and 3) Pneumonia vaccinations for all patient's older than 19 in receiving systemic immunosuppression  4) shingles vaccine with non live virus in all patients receiving systemic immunosuppression is specially Kosse Mac  5) QuantiFERON prior to immunosuppression and then annually  6) If the patient is not immune to measles mumps or rubella should receive immunization, but this should be avoided in patients on systemic immunosuppression  7) if the patient is not immune to varicella they should receive immunization, but this should be avoided in patients on systemic immunosuppression  8) HPV vaccination as per national guidelines  9) hepatitis a and B vaccinations if not previously vaccinated     Cancer screening  1) colonoscopy in all patients with extensive colitis (more than 1/3 of the colon involved) who had disease for at least 8 or more years. Repeat colonoscopy approximately every 12-24 months. In patients with concurrent primary sclerosing cholangitis repeat colonoscopy annually and consider use of cromolyn endoscopy. Consider use of cromolyn endoscopy in patients with prior dysplasia or a family history of colon cancer. 2) in all woman with inflammatory bowel disease treated with systemic immunosuppression. 3) in all patients with IBD, especially those on immunosuppression including anti TNF therapy as well as thiopurines     Miscellaneous  1) DEXA scan in all patients with IBD if any risk factor for osteoporosis (low BMI, 3 months or more of cumulative steroid exposure, smoker, postmenopausal).   If initial screen is normal repeat in 5 years  2) screen all patients with IBD for smoking status and refer current smokers for smoking cessation therapy  3) In all patients with IBD at baseline and consider annual screening

## 2023-11-21 LAB
25(OH)D3+25(OH)D2 SERPL-MCNC: 19.3 NG/ML (ref 30–100)
ALBUMIN SERPL-MCNC: 4.6 G/DL (ref 4–5)
ALBUMIN/GLOB SERPL: 1.5 {RATIO} (ref 1.2–2.2)
ALP SERPL-CCNC: 78 IU/L (ref 44–121)
ALT SERPL-CCNC: 25 IU/L (ref 0–32)
AST SERPL-CCNC: 17 IU/L (ref 0–40)
BILIRUB SERPL-MCNC: 0.3 MG/DL (ref 0–1.2)
BUN SERPL-MCNC: 12 MG/DL (ref 6–20)
BUN/CREAT SERPL: 13 (ref 9–23)
CALCIUM SERPL-MCNC: 9.9 MG/DL (ref 8.7–10.2)
CHLORIDE SERPL-SCNC: 101 MMOL/L (ref 96–106)
CO2 SERPL-SCNC: 18 MMOL/L (ref 20–29)
CREAT SERPL-MCNC: 0.94 MG/DL (ref 0.57–1)
CRP SERPL-MCNC: 15 MG/L (ref 0–10)
EGFR: 86 ML/MIN/1.73
ERYTHROCYTE [DISTWIDTH] IN BLOOD BY AUTOMATED COUNT: 12.6 % (ref 11.7–15.4)
GLOBULIN SER-MCNC: 3.1 G/DL (ref 1.5–4.5)
GLUCOSE SERPL-MCNC: 147 MG/DL (ref 70–99)
HCT VFR BLD AUTO: 47.3 % (ref 34–46.6)
HGB BLD-MCNC: 15.2 G/DL (ref 11.1–15.9)
MCH RBC QN AUTO: 28.5 PG (ref 26.6–33)
MCHC RBC AUTO-ENTMCNC: 32.1 G/DL (ref 31.5–35.7)
MCV RBC AUTO: 89 FL (ref 79–97)
PLATELET # BLD AUTO: 560 X10E3/UL (ref 150–450)
POTASSIUM SERPL-SCNC: 4.6 MMOL/L (ref 3.5–5.2)
PROT SERPL-MCNC: 7.7 G/DL (ref 6–8.5)
RBC # BLD AUTO: 5.33 X10E6/UL (ref 3.77–5.28)
SODIUM SERPL-SCNC: 137 MMOL/L (ref 134–144)
WBC # BLD AUTO: 8 X10E3/UL (ref 3.4–10.8)

## 2023-11-24 ENCOUNTER — TELEPHONE (OUTPATIENT)
Dept: GASTROENTEROLOGY | Facility: CLINIC | Age: 28
End: 2023-11-24

## 2023-11-24 NOTE — TELEPHONE ENCOUNTER
11/20/2023 CBC showed thrombocytosis 560,000, CMP showed CO2 18 but stable. CRP elevated at 15  (improved from 18). Vitamin D low at 19.3. (improved from 16.1). Pt already on supplementation. Should cont Lialda, complete colonoscopy, see PCP for Vit D replacement.     LMOM on both numbers for call back to discuss

## 2023-11-27 NOTE — TELEPHONE ENCOUNTER
Spoke with patient, lab results reviewed, she will cont Lialda and complete colonoscopy. She will address vitamin deficiency D deficiency with PCP at next visit to ensure adequate replacement.

## 2024-01-03 ENCOUNTER — ANESTHESIA (OUTPATIENT)
Dept: ANESTHESIOLOGY | Facility: AMBULATORY SURGERY CENTER | Age: 29
End: 2024-01-03

## 2024-01-03 ENCOUNTER — ANESTHESIA EVENT (OUTPATIENT)
Dept: ANESTHESIOLOGY | Facility: AMBULATORY SURGERY CENTER | Age: 29
End: 2024-01-03

## 2024-01-11 ENCOUNTER — TELEPHONE (OUTPATIENT)
Dept: GASTROENTEROLOGY | Facility: CLINIC | Age: 29
End: 2024-01-11

## 2024-01-11 NOTE — TELEPHONE ENCOUNTER
Patients GI provider:  Dr. Maurer    Number to return call: [phone]    Reason for call: Pt called back regarding this. Asked [facility] questions, pt passed.    Scheduled procedure/appointment date if applicable: Procedure 01/17/2024

## 2024-01-11 NOTE — TELEPHONE ENCOUNTER
Pt originally scheduled in September.  Appt January 17 2024.  Please update OA/ASC and let me know when completed.  Thank you.

## 2024-01-12 ENCOUNTER — TELEPHONE (OUTPATIENT)
Dept: GASTROENTEROLOGY | Facility: CLINIC | Age: 29
End: 2024-01-12

## 2024-01-12 NOTE — TELEPHONE ENCOUNTER
Procedure confirmed  Endoscopy     Via: MyChart    Instructions given: Given to Patient at Visit     Prep Given: N/A    Call the office if there are any questions.

## 2024-01-17 ENCOUNTER — ANESTHESIA (OUTPATIENT)
Dept: GASTROENTEROLOGY | Facility: AMBULATORY SURGERY CENTER | Age: 29
End: 2024-01-17

## 2024-01-17 ENCOUNTER — ANESTHESIA EVENT (OUTPATIENT)
Dept: GASTROENTEROLOGY | Facility: AMBULATORY SURGERY CENTER | Age: 29
End: 2024-01-17

## 2024-01-17 ENCOUNTER — HOSPITAL ENCOUNTER (OUTPATIENT)
Dept: GASTROENTEROLOGY | Facility: AMBULATORY SURGERY CENTER | Age: 29
Discharge: HOME | End: 2024-01-17
Payer: COMMERCIAL

## 2024-01-17 VITALS
WEIGHT: 160 LBS | OXYGEN SATURATION: 98 % | DIASTOLIC BLOOD PRESSURE: 89 MMHG | SYSTOLIC BLOOD PRESSURE: 134 MMHG | BODY MASS INDEX: 27.31 KG/M2 | HEART RATE: 74 BPM | RESPIRATION RATE: 16 BRPM | HEIGHT: 64 IN | TEMPERATURE: 98.4 F

## 2024-01-17 DIAGNOSIS — K50.019 CROHN'S DISEASE OF SMALL INTESTINE WITH COMPLICATION (HCC): ICD-10-CM

## 2024-01-17 LAB
EXT PREGNANCY TEST URINE: NEGATIVE
EXT. CONTROL: NORMAL

## 2024-01-17 PROCEDURE — 45380 COLONOSCOPY AND BIOPSY: CPT | Performed by: INTERNAL MEDICINE

## 2024-01-17 PROCEDURE — 88305 TISSUE EXAM BY PATHOLOGIST: CPT | Performed by: PATHOLOGY

## 2024-01-17 RX ORDER — LIDOCAINE HYDROCHLORIDE 20 MG/ML
INJECTION, SOLUTION EPIDURAL; INFILTRATION; INTRACAUDAL; PERINEURAL AS NEEDED
Status: DISCONTINUED | OUTPATIENT
Start: 2024-01-17 | End: 2024-01-17

## 2024-01-17 RX ORDER — PROPOFOL 10 MG/ML
INJECTION, EMULSION INTRAVENOUS AS NEEDED
Status: DISCONTINUED | OUTPATIENT
Start: 2024-01-17 | End: 2024-01-17

## 2024-01-17 RX ORDER — SODIUM CHLORIDE, SODIUM LACTATE, POTASSIUM CHLORIDE, CALCIUM CHLORIDE 600; 310; 30; 20 MG/100ML; MG/100ML; MG/100ML; MG/100ML
50 INJECTION, SOLUTION INTRAVENOUS CONTINUOUS
Status: DISCONTINUED | OUTPATIENT
Start: 2024-01-17 | End: 2024-01-21 | Stop reason: HOSPADM

## 2024-01-17 RX ADMIN — SODIUM CHLORIDE, SODIUM LACTATE, POTASSIUM CHLORIDE, CALCIUM CHLORIDE 50 ML/HR: 600; 310; 30; 20 INJECTION, SOLUTION INTRAVENOUS at 10:18

## 2024-01-17 RX ADMIN — PROPOFOL 50 MG: 10 INJECTION, EMULSION INTRAVENOUS at 10:49

## 2024-01-17 RX ADMIN — PROPOFOL 100 MG: 10 INJECTION, EMULSION INTRAVENOUS at 10:43

## 2024-01-17 RX ADMIN — PROPOFOL 60 MG: 10 INJECTION, EMULSION INTRAVENOUS at 10:45

## 2024-01-17 RX ADMIN — PROPOFOL 40 MG: 10 INJECTION, EMULSION INTRAVENOUS at 10:55

## 2024-01-17 RX ADMIN — LIDOCAINE HYDROCHLORIDE 40 MG: 20 INJECTION, SOLUTION EPIDURAL; INFILTRATION; INTRACAUDAL; PERINEURAL at 10:43

## 2024-01-17 RX ADMIN — PROPOFOL 40 MG: 10 INJECTION, EMULSION INTRAVENOUS at 10:47

## 2024-01-17 RX ADMIN — PROPOFOL 40 MG: 10 INJECTION, EMULSION INTRAVENOUS at 10:51

## 2024-01-17 NOTE — ANESTHESIA PREPROCEDURE EVALUATION
Procedure:  COLONOSCOPY    Relevant Problems   GI/HEPATIC   (+) Gastroesophageal reflux disease without esophagitis      Other   (+) Crohn's disease (HCC)        Physical Exam    Airway    Mallampati score: II  TM Distance: >3 FB  Neck ROM: full     Dental   No notable dental hx     Cardiovascular      Pulmonary      Other Findings  post-pubertal.      Anesthesia Plan  ASA Score- 2     Anesthesia Type- IV sedation with anesthesia with ASA Monitors.         Additional Monitors:     Airway Plan:            Plan Factors-    Chart reviewed.    Patient summary reviewed.    Patient is not a current smoker.              Induction- intravenous.    Postoperative Plan-     Informed Consent- Anesthetic plan and risks discussed with patient.  I personally reviewed this patient with the CRNA. Discussed and agreed on the Anesthesia Plan with the CRNA..

## 2024-01-17 NOTE — H&P
History and Physical - [facility], later [facility]    Megha Sprague 28 y.o. female MRN: [id number]      HPI: Megha Sprague is a 28 y.o. female who presents for h/o Crohn's of SB.    No Known Allergies      REVIEW OF SYSTEMS: Per the HPI, and otherwise unremarkable.    Historical Information     Past Medical History:   Diagnosis Date    Crohn's disease (HCC)     Duodenal ulcer     Gastric ulcer     GERD (gastroesophageal reflux disease)     Ileitis 12/2018    seen on colo 12/18/2018    Lactose intolerance Unknown     Past Surgical History:   Procedure Laterality Date    ABDOMINAL SURGERY      Ovarian cyst removal    COLONOSCOPY  12/18/2018    COLONOSCOPY  12/2018    OVARIAN CYST REMOVAL      WISDOM TOOTH EXTRACTION       Social History   Social History     Substance and Sexual Activity   Alcohol Use Yes    Alcohol/week: 1.0 standard drink of alcohol    Types: 1 Standard drinks or equivalent per week     Social History     Substance and Sexual Activity   Drug Use Never     Social History     Tobacco Use   Smoking Status Never   Smokeless Tobacco Never     Family History   Problem Relation Age of Onset    Hypertension Mother     Colon polyps Mother         per GI notes    Arthritis Mother     Hyperlipidemia Mother     Irritable bowel syndrome Mother     Colon polyps Father     Hypertension Father     Diabetes Father     MAY disease Father     Hyperlipidemia Father     MAY disease Brother     Colon polyps Maternal Uncle     Crohn's disease Cousin     Cancer Maternal Grandfather     Irritable bowel syndrome Maternal Grandfather     Breast cancer Maternal Grandmother     Diabetes Paternal Grandfather     Colorectal Cancer Neg Hx        Meds/Allergies       Current Outpatient Medications:     cholecalciferol (VITAMIN D3) 1,000 units tablet    Junel FE 1/20 1-20 MG-MCG per tablet    LORazepam (ATIVAN) 1 mg tablet    mesalamine (LIALDA) 1.2 g EC tablet    cholestyramine (QUESTRAN) 4 g packet    omeprazole  "(PriLOSEC) 40 MG capsule    polyethylene glycol (Golytely) 4000 mL solution    Current Facility-Administered Medications:     lactated ringers infusion, 50 mL/hr, Intravenous, Continuous, 50 mL/hr at 01/17/24 1018        Objective     /89   Pulse 91   Temp 98.4 °F (36.9 °C) (Temporal)   Resp 12   Ht 5' 4\" (1.626 m)   Wt 72.6 kg (160 lb)   LMP 01/03/2024   SpO2 97%   BMI 27.46 kg/m²       PHYSICAL EXAM    Gen: NAD AAOx3  Head: Normocephalic, Atraumatic  CV: S1S2 RRR no m/r/g  CHEST: Clear b/l no c/r/w  ABD: soft, +BS NT/ND no masses  EXT: no edema      ASSESSMENT/PLAN:  This is a 28 y.o. year old female here for colonoscopy, and she is stable and optimized for her procedure.        "

## 2024-01-17 NOTE — ANESTHESIA POSTPROCEDURE EVALUATION
Post-Op Assessment Note    CV Status:  Stable  Pain Score: 0    Pain management: adequate       Mental Status:  Alert and awake   Hydration Status:  Euvolemic   PONV Controlled:  Controlled   Airway Patency:  Patent     Post Op Vitals Reviewed: Yes      Staff: CRNA               BP   108/70   Temp      Pulse  83   Resp   15   SpO2   98

## 2024-01-18 ENCOUNTER — TELEPHONE (OUTPATIENT)
Dept: GASTROENTEROLOGY | Facility: CLINIC | Age: 29
End: 2024-01-18

## 2024-01-18 PROCEDURE — 88305 TISSUE EXAM BY PATHOLOGIST: CPT | Performed by: PATHOLOGY

## 2024-01-18 NOTE — TELEPHONE ENCOUNTER
----- Message from Brooklyn Maurer MD sent at 1/18/2024  3:47 PM EST -----  RECALL 1 year.    Please call pt to schedule a f/u w me asap.

## 2024-01-23 ENCOUNTER — OFFICE VISIT (OUTPATIENT)
Dept: GASTROENTEROLOGY | Facility: CLINIC | Age: 29
End: 2024-01-23
Payer: COMMERCIAL

## 2024-01-23 VITALS
WEIGHT: 189 LBS | BODY MASS INDEX: 32.27 KG/M2 | DIASTOLIC BLOOD PRESSURE: 84 MMHG | SYSTOLIC BLOOD PRESSURE: 122 MMHG | HEIGHT: 64 IN

## 2024-01-23 DIAGNOSIS — E55.9 VITAMIN D DEFICIENCY: ICD-10-CM

## 2024-01-23 DIAGNOSIS — Z12.11 COLON CANCER SCREENING: ICD-10-CM

## 2024-01-23 DIAGNOSIS — K50.019 CROHN'S DISEASE OF SMALL INTESTINE WITH COMPLICATION (HCC): Primary | ICD-10-CM

## 2024-01-23 PROCEDURE — 99214 OFFICE O/P EST MOD 30 MIN: CPT | Performed by: INTERNAL MEDICINE

## 2024-01-23 RX ORDER — MELATONIN
2000 DAILY
Qty: 180 TABLET | Refills: 1 | Status: SHIPPED | OUTPATIENT
Start: 2024-01-23

## 2024-01-23 RX ORDER — MESALAMINE 1.2 G/1
2.4 TABLET, DELAYED RELEASE ORAL DAILY
Qty: 180 TABLET | Refills: 3 | Status: SHIPPED | OUTPATIENT
Start: 2024-01-23

## 2024-01-23 NOTE — PROGRESS NOTES
[facility] - Outpatient Follow-up Note  Megha Sprague 28 y.o. female MRN: [id number]  Encounter: [id number]    ASSESSMENT AND PLAN:      1. Crohn's disease of small intestine with complication (HCC)  28F here today for f/u. Recent colonoscopy showing sig terminal ileitis. Colon mucosa normal.     Pt is asymptomatic and overall feels well on Lialda alone. We had a long discussion with patient and mom - options of monitoring and staying on the course vs escalating therapy. 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     - We discussed all the different options available. Answered all questions.     - immunizations reviewed: Recommend annual flu shot.  Stay up-to-date with pneumococcal vaccination, zoster vaccination.  Recommend non live vaccinations.  - bone health discussed.  Vitamin-D level ordered.  DEXA scan up-to-date if appropriate.  - annual blood work next due now : CBC, CMP, CRP, Vit D, TB QuantiFERON  - colon cancer surveillance up-to-date - next due 1/2029 but if we decide to escalate therapy, I'd like to repeat a colonoscopy or a SBC in one year.  - recommend annual gyn exam  - annual dermatology exam    - mesalamine (LIALDA) 1.2 g EC tablet; Take 2 tablets (2.4 g total) by mouth daily  Dispense: 180 tablet; Refill: 3    - CBC; Future  - Comprehensive metabolic panel; Future  - C-reactive protein; Future  - Hepatitis B surface antigen; Future  - Hepatitis A antibody, total; Future  - Hepatitis B surface antibody; Future  - Hepatitis C antibody; Future  - Hepatitis B core antibody, total; Future  - Measles/Mumps/Rubella Immunity; Future  - Varicella zoster antibody, IgG; Future  - QuantiFERON-TB Gold Plus [facility]; Future    - MRI enterography w wo; Future    2. Vitamin D deficiency    - cholecalciferol (VITAMIN D3) 1,000 units tablet; Take 2 tablets (2,000 Units total) by mouth daily  Dispense: 180 tablet; Refill: 1    - Vitamin D 25 hydroxy    3. Colon  cancer screening  Recall 1/2029      Followup Appointment: 6 months  ______________________________________________________________________    Chief Complaint   Patient presents with   • Follow up to colonoscopy     HPI:  28F here today w her mom for f/u. Colonoscopy bx from 1/17/24 reviewed. Terminal ileitis. Pt has 1-4 BMs daily. 50% loose. Occ blood. No sig abd pain. No longer w reflux and off PPI. Weight is stable. Works as a PA in ER.     Historical Information   Past Medical History:   Diagnosis Date   • Crohn's disease (HCC)    • Duodenal ulcer    • Gastric ulcer    • GERD (gastroesophageal reflux disease)    • Ileitis 12/2018    seen on colo 12/18/2018   • Lactose intolerance Unknown     Past Surgical History:   Procedure Laterality Date   • ABDOMINAL SURGERY      Ovarian cyst removal   • COLONOSCOPY  12/18/2018   • COLONOSCOPY  12/2018   • OVARIAN CYST REMOVAL     • WISDOM TOOTH EXTRACTION       Social History     Substance and Sexual Activity   Alcohol Use Yes   • Alcohol/week: 1.0 standard drink of alcohol   • Types: 1 Standard drinks or equivalent per week     Social History     Substance and Sexual Activity   Drug Use Never     Social History     Tobacco Use   Smoking Status Never   Smokeless Tobacco Never     Family History   Problem Relation Age of Onset   • Hypertension Mother    • Colon polyps Mother         per GI notes   • Arthritis Mother    • Hyperlipidemia Mother    • Irritable bowel syndrome Mother    • Colon polyps Father    • Hypertension Father    • Diabetes Father    • MAY disease Father    • Hyperlipidemia Father    • MAY disease Brother    • Colon polyps Maternal Uncle    • Crohn's disease Cousin    • Cancer Maternal Grandfather    • Irritable bowel syndrome Maternal Grandfather    • Breast cancer Maternal Grandmother    • Diabetes Paternal Grandfather    • Colorectal Cancer Neg Hx          Current Outpatient Medications:   •  cholecalciferol (VITAMIN D3) 1,000 units tablet  •  Junel FE  "1/20 1-20 MG-MCG per tablet  •  LORazepam (ATIVAN) 1 mg tablet  •  mesalamine (LIALDA) 1.2 g EC tablet  No Known Allergies  Reviewed medications and allergies and updated as indicated    PHYSICAL EXAM:    Blood pressure 122/84, height 5' 4\" (1.626 m), weight 85.7 kg (189 lb), last menstrual period 01/03/2024. Body mass index is 32.44 kg/m².  General Appearance: NAD, cooperative, alert  Eyes: Anicteric, conjunctiva pink  ENT:  Normocephalic, atraumatic, normal mucosa.    Neck:  Supple, symmetrical, trachea midline  Resp:  Clear to auscultation bilaterally; no rales, rhonchi or wheezing; respirations unlabored   CV:  S1 S2, Regular rate and rhythm; no murmur, rub, or gallop.  GI:  Soft, non-tender, non-distended; normal bowel sounds; no masses, no organomegaly   Rectal: Deferred  Musculoskeletal: No cyanosis, clubbing or edema. Normal ROM.  Skin:  No jaundice, rashes, or lesions   Heme/Lymph: No palpable cervical lymphadenopathy  Psych: Normal affect, good eye contact  Neuro: No gross deficits, AAOx3    Lab Results:   Lab Results   Component Value Date    WBC 8.0 11/20/2023    HGB 15.2 11/20/2023    HCT 47.3 (H) 11/20/2023    MCV 89 11/20/2023     (H) 11/20/2023     Lab Results   Component Value Date    K 4.6 11/20/2023     11/20/2023    CO2 18 (L) 11/20/2023    BUN 12 11/20/2023    CREATININE 0.94 11/20/2023    AST 17 11/20/2023    ALT 25 11/20/2023    EGFR 86 11/20/2023       Radiology Results:   Colonoscopy    Result Date: 1/17/2024  Narrative: Table formatting from the original result was not included. [facility] [address] [place] [address] [id number] [phone] DATE OF SERVICE: 1/17/24 PHYSICIAN(S): Attending: Brooklyn Maurer MD INDICATION: Crohn's disease of small intestine with complication (HCC) POST-OP DIAGNOSIS: See the impression below. HISTORY: Prior colonoscopy: 5 years ago. BOWEL PREPARATION: Golytely/Colyte/Trilyte PREPROCEDURE: Informed consent was " obtained for the procedure, including sedation. Risks including but not limited to bleeding, infection, perforation, adverse drug reaction and aspiration were explained in detail. Also explained about less than 100% sensitivity with the exam and other alternatives. The patient was placed in the left lateral decubitus position. Procedure: Colonoscopy DETAILS OF PROCEDURE: Patient was taken to the procedure room where a time out was performed to confirm correct patient and correct procedure. The patient underwent monitored anesthesia care, which was administered by an anesthesia professional. The patient's blood pressure, heart rate, level of consciousness, oxygen, respirations and ECG were monitored throughout the procedure. A digital rectal exam was performed. The scope was introduced through the anus and advanced to the cecum. Retroflexion was performed in the rectum. The quality of bowel preparation was evaluated using the [place] Bowel Preparation Scale with scores of: right colon = 2, transverse colon = 2, left colon = 2. The total BBPS score was 6. Bowel prep was adequate. The patient experienced no blood loss. The procedure was not difficult. The patient tolerated the procedure well. There were no apparent adverse events. ANESTHESIA INFORMATION: ASA: II Anesthesia Type: IV Sedation with Anesthesia MEDICATIONS: lactated ringers infusion 400 mL*  *From user-documented volume (Totals for administrations occurring from 1041 to 1100 on 01/17/24) FINDINGS: Moderate, generalized atrophic, cobblestone, edematous, erythematous, friable, granular, hemorrhagic, nodular and ulcerated mucosa in the terminal ileum, consistent with Crohn's disease; performed cold forceps biopsy Internal small hemorrhoids The ileocecal valve, cecum, ascending colon, hepatic flexure, transverse colon, splenic flexure, descending colon, sigmoid colon, rectosigmoid and rectum appeared normal. Performed random biopsy using biopsy forceps. EVENTS:  Procedure Events Event Event Time ENDO CECUM REACHED 1/17/2024 10:47 AM ENDO SCOPE OUT TIME 1/17/2024 11:00 AM SPECIMENS: ID Type Source Tests Collected by Time Destination 1 : terminal lleum bx hx of crohns r/o dysplasia Tissue Ileum, ileostomy stoma TISSUE EXAM Brooklyn Maurer MD 1/17/2024 11:01 AM  2 : right colon bx hx of crohns r/o dysplasia Tissue Large Intestine, Right/Ascending Colon TISSUE EXAM Brooklyn Maurer MD 1/17/2024 11:03 AM  3 : transverse colon bx hx of crohns r/o dysplasia Tissue Large Intestine, Transverse Colon TISSUE EXAM Brooklyn Maurer MD 1/17/2024 11:04 AM  4 : left colon + rectum bx hx of crohns r/o dysplasia Tissue Large Intestine, Left/Descending Colon TISSUE EXAM Brooklyn Maurer MD 1/17/2024 11:05 AM  EQUIPMENT: Colonoscope -[id number]     Impression: Moderate abnormal mucosa in the terminal ileum, consistent with Crohn's disease; performed cold forceps biopsy Small hemorrhoids The ileocecal valve, cecum, ascending colon, hepatic flexure, transverse colon, splenic flexure, descending colon, sigmoid colon, rectosigmoid and rectum appeared normal. Performed random biopsy using biopsy forceps. RECOMMENDATION:  Repeat colonoscopy in 5 years  Inflammatory bowel disease   Resume home meds. Resume previous diet. Follow up with your primary care provider as previously scheduled. Follow up with GI to discuss escalating therapy given ongoing terminal ileitis. The biopsy results will be available in [employer] in 1-2 weeks.  Brooklyn Maurer MD       Answers submitted by the patient for this visit:  Inflammatory Bowel Disease (Submitted on 1/20/2024)  When you are not experiencing symptoms of your inflammatory bowel disease, how many bowel movements do you typically have each day?: 2  What is the average (typical) number of bowel movements that you had in a single day during the last week?: 3  Over the last 3 days, have you had any bowel movements where you passed blood without stool?: No  Since your last  visit, have you received any vaccinations?: No  Since the last visit, have you had an infection?: No  Is there any possibility that you may be pregnant?: No  In the past three months, have you used tobacco in any form?: No  During the last year, how many days have you missed work or school because of your inflammatory bowel disease?: 0  During the last year, how many days have you been hospitalized because of your inflammatory bowel disease?: 0  During the last year, how many days have you visited a hospital emergency department because of your inflammatory bowel disease?: 0  During the last month, have you taken narcotic pain medications (such as Percocet, oxycodone, Oxycontin, morphine, Vicodin, Dilaudid, MS Contin) for your inflammatory bowel disease?: No  Have you awoken at night because you needed to move your bowels during the last month? : Yes  Have you had leakage of stool while sleeping during the last month?: No  Have you had leakage of stool while you were awake during the last month?: No  In the last 6 months, have you unintentionally lost weight?: No  Fever: No  Eye irritation: Yes  Mouth sores: No  Sore throat: No  Chest pain: No  Shortness of breath: No  Numbness or tingling in your hands or feet: No  Skin rash: No  Pain or swelling in your joints: No  Bruising or bleeding: No  Felt depressed or blue: No

## 2024-01-23 NOTE — PATIENT INSTRUCTIONS
"IBD Healthcare maintenance recommendations:    Vaccines and infections  1) avoid live vaccines  2) yearly flu shot  3) Prevnar 13, followed by Pneumovax at least 8 weeks later (and a Pneumovax booster 5 years after)  4) Shingrix  5) Quantiferon prior to immunosuppression and then annually  6) MMR prior to biologic  7) varicella prior to biologic  8) HPV vaccination as per national guidelines  9) hepatitis a and B vaccinations if not previously vaccinated    Cancer screening  1) dysplasia surveillance for colorectal cancer  2) Pap smears (especially for woman on immunosuppression)  3) annual dermatologic/skin exam    Miscellaneous  1) screening for osteoporosis  2) smoking screen in counseling  3) depression screen annually      Health Maintenance recomendations guidelines based on [facility] recommendations    Vaccines  1) Influenza vaccine (\"Flu shot\") - Dead virus- annually is suggested   2 and 3) Pneumonia vaccinations for all patient's older than 19 in receiving systemic immunosuppression  4) shingles vaccine with non live virus in all patients receiving systemic immunosuppression is specially Xeljanz  5) QuantiFERON prior to immunosuppression and then annually  6) If the patient is not immune to measles mumps or rubella should receive immunization, but this should be avoided in patients on systemic immunosuppression  7) if the patient is not immune to varicella they should receive immunization, but this should be avoided in patients on systemic immunosuppression  8) HPV vaccination as per national guidelines  9) hepatitis a and B vaccinations if not previously vaccinated     Cancer screening  1) colonoscopy in all patients with extensive colitis (more than 1/3 of the colon involved) who had disease for at least 8 or more years.  Repeat colonoscopy approximately every 12-24 months.  In patients with concurrent primary sclerosing cholangitis repeat colonoscopy annually and consider use of cromolyn endoscopy.  Consider " use of cromolyn endoscopy in patients with prior dysplasia or a family history of colon cancer.  2) in all woman with inflammatory bowel disease treated with systemic immunosuppression.    3) in all patients with IBD, especially those on immunosuppression including anti TNF therapy as well as thiopurines    Miscellaneous  1) DEXA scan in all patients with IBD if any risk factor for osteoporosis (low BMI, 3 months or more of cumulative steroid exposure, smoker, postmenopausal).  If initial screen is normal repeat in 5 years  2) screen all patients with IBD for smoking status and refer current smokers for smoking cessation therapy  3) In all patients with IBD at baseline and consider annual screening

## 2024-02-21 PROBLEM — Z12.11 COLON CANCER SCREENING: Status: RESOLVED | Noted: 2020-05-05 | Resolved: 2024-02-21

## 2024-03-25 LAB
25(OH)D3+25(OH)D2 SERPL-MCNC: 19.3 NG/ML (ref 30–100)
ALBUMIN SERPL-MCNC: 4.3 G/DL (ref 4–5)
ALBUMIN/GLOB SERPL: 1.6 {RATIO} (ref 1.2–2.2)
ALP SERPL-CCNC: 73 IU/L (ref 44–121)
ALT SERPL-CCNC: 30 IU/L (ref 0–32)
AST SERPL-CCNC: 18 IU/L (ref 0–40)
BILIRUB SERPL-MCNC: <0.2 MG/DL (ref 0–1.2)
BUN SERPL-MCNC: 14 MG/DL (ref 6–20)
BUN/CREAT SERPL: 14 (ref 9–23)
CALCIUM SERPL-MCNC: 9.6 MG/DL (ref 8.7–10.2)
CHLORIDE SERPL-SCNC: 105 MMOL/L (ref 96–106)
CO2 SERPL-SCNC: 21 MMOL/L (ref 20–29)
CREAT SERPL-MCNC: 1.03 MG/DL (ref 0.57–1)
CRP SERPL-MCNC: 18 MG/L (ref 0–10)
EGFR: 77 ML/MIN/1.73
ERYTHROCYTE [DISTWIDTH] IN BLOOD BY AUTOMATED COUNT: 12.3 % (ref 11.7–15.4)
GAMMA INTERFERON BACKGROUND BLD IA-ACNC: 0 IU/ML
GLOBULIN SER-MCNC: 2.7 G/DL (ref 1.5–4.5)
GLUCOSE SERPL-MCNC: 98 MG/DL (ref 70–99)
HBV SURFACE AB SER-ACNC: 34.5 MIU/ML
HCT VFR BLD AUTO: 41.9 % (ref 34–46.6)
HCV AB S/CO SERPL IA: NON REACTIVE
HGB BLD-MCNC: 13.5 G/DL (ref 11.1–15.9)
M TB IFN-G CD4+ T-CELLS BLD-ACNC: 0.01 IU/ML
M TB IFN-G CD4+ T-CELLS BLD-ACNC: 0.02 IU/ML
MCH RBC QN AUTO: 28.3 PG (ref 26.6–33)
MCHC RBC AUTO-ENTMCNC: 32.2 G/DL (ref 31.5–35.7)
MCV RBC AUTO: 88 FL (ref 79–97)
MEV IGG SER IA-ACNC: 53.6 AU/ML
MITOGEN IGNF BLD-ACNC: >10 IU/ML
MUV IGG SER IA-ACNC: 35.9 AU/ML
PLATELET # BLD AUTO: 406 X10E3/UL (ref 150–450)
POTASSIUM SERPL-SCNC: 5.1 MMOL/L (ref 3.5–5.2)
PROT SERPL-MCNC: 7 G/DL (ref 6–8.5)
QUANTIFERON INCUBATION COMMENT: NORMAL
QUANTIFERON-TB GOLD PLUS: NEGATIVE
RBC # BLD AUTO: 4.77 X10E6/UL (ref 3.77–5.28)
RUBV IGG SERPL IA-ACNC: 1.11 INDEX
SERVICE CMNT-IMP: NORMAL
SODIUM SERPL-SCNC: 141 MMOL/L (ref 134–144)
VZV IGG SER IA-ACNC: 178 INDEX
WBC # BLD AUTO: 8.9 X10E3/UL (ref 3.4–10.8)

## 2024-03-27 ENCOUNTER — HOSPITAL ENCOUNTER (OUTPATIENT)
Dept: MRI IMAGING | Facility: HOSPITAL | Age: 29
Discharge: HOME | End: 2024-03-27
Attending: INTERNAL MEDICINE
Payer: COMMERCIAL

## 2024-03-27 ENCOUNTER — TELEPHONE (OUTPATIENT)
Dept: GASTROENTEROLOGY | Facility: CLINIC | Age: 29
End: 2024-03-27

## 2024-03-27 DIAGNOSIS — K50.019 CROHN'S DISEASE OF SMALL INTESTINE WITH COMPLICATION (HCC): ICD-10-CM

## 2024-03-27 PROCEDURE — A9585 GADOBUTROL INJECTION: HCPCS | Performed by: INTERNAL MEDICINE

## 2024-03-27 PROCEDURE — 74183 MRI ABD W/O CNTR FLWD CNTR: CPT

## 2024-03-27 PROCEDURE — 72197 MRI PELVIS W/O & W/DYE: CPT

## 2024-03-27 PROCEDURE — G1004 CDSM NDSC: HCPCS

## 2024-03-27 RX ORDER — GADOBUTROL 604.72 MG/ML
9 INJECTION INTRAVENOUS
Status: COMPLETED | OUTPATIENT
Start: 2024-03-27 | End: 2024-03-27

## 2024-03-27 RX ADMIN — GADOBUTROL 9 ML: 604.72 INJECTION INTRAVENOUS at 10:10

## 2024-03-27 RX ADMIN — GLUCAGON HYDROCHLORIDE 1 MG: KIT at 09:46

## 2024-03-27 NOTE — TELEPHONE ENCOUNTER
----- Message from Brooklyn Maurer MD sent at 3/26/2024  2:04 PM EDT -----  Ongoing vitamin D deficiency. Otherwise labs OK.     I'd like for the patient to start Rinvoq. 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

## 2024-03-27 NOTE — TELEPHONE ENCOUNTER
Would you please confirm that you're okay with using Hep B surface antibody that proves immunity instead of Hep B surface antigen. Thank you!

## 2024-04-04 ENCOUNTER — NURSE TRIAGE (OUTPATIENT)
Age: 29
End: 2024-04-04

## 2024-04-04 DIAGNOSIS — R19.7 DIARRHEA OF PRESUMED INFECTIOUS ORIGIN: Primary | ICD-10-CM

## 2024-04-04 NOTE — TELEPHONE ENCOUNTER
"Please advise   Last OV: 1/23/24   Hx: Crohn's, Vitamin D deficiency     Pt calling in, reports since MRI enterography on 3/27/24 she vomited once after the Gadobutrol injection and then past Sunday thinks she had the stomach virus. She reports she is still have loss of appetite, nausea- taking zofran, diarrhea 4-5x a day and a bloating/ gas discomfort with intermittent sharp pains in upper abdomen radiating to back 5-6/10 pain.   Denies blood in stool or any further vomiting     Pt is on mesalamine 2 tablets daily    I advised pt to stay hydrated and bland diet, Please advise if other recs. Pt concerned if it is stomach virus or due to her crohn's       Reason for Disposition   MILD TO MODERATE pain that comes and goes (cramps) lasts > 24 hours    Answer Assessment - Initial Assessment Questions  1. LOCATION: \"Where does it hurt?\"       Upper GI   2. RADIATION: \"Does the pain shoot anywhere else?\" (e.g., chest, back)      back  3. ONSET: \"When did the pain begin?\" (e.g., minutes, hours or days ago)       3/26 after MRI   4. SUDDEN: \"Gradual or sudden onset?\"      Gradual   5. PATTERN \"Does the pain come and go, or is it constant?\"     - If constant: \"Is it getting better, staying the same, or worsening?\"       (Note: Constant means the pain never goes away completely; most serious pain is constant and it progresses)      - If intermittent: \"How long does it last?\" \"Do you have pain now?\"      (Note: Intermittent means the pain goes away completely between bouts)      Intermittent   6. SEVERITY: \"How bad is the pain?\"  (e.g., Scale 1-10; mild, moderate, or severe)     - MILD (1-3): doesn't interfere with normal activities, abdomen soft and not tender to touch      - MODERATE (4-7): interferes with normal activities or awakens from sleep, tender to touch      - SEVERE (8-10): excruciating pain, doubled over, unable to do any normal activities        5-6/10   7. RECURRENT SYMPTOM: \"Have you ever had this type of " "stomach pain before?\" If Yes, ask: \"When was the last time?\" and \"What happened that time?\"       Unknown   8. AGGRAVATING FACTORS: \"Does anything seem to cause this pain?\" (e.g., foods, stress, alcohol)      No   9. CARDIAC SYMPTOMS: \"Do you have any of the following symptoms: chest pain, difficulty breathing, sweating, nausea?\"      Nausea   10. OTHER SYMPTOMS: \"Do you have any other symptoms?\" (e.g., fever, vomiting, diarrhea)        Diarrhea    Protocols used: Abdominal Pain - Upper-ADULT-[place]    "

## 2024-04-04 NOTE — RESULT ENCOUNTER NOTE
MRE: Mild active inflammatory bowel disease as evidenced by mucosal hyperenhancement superimposed upon changes of chronic inflammatory bowel disease (sacculation, fibrofatty proliferation, intermittent stricturing without prestenotic dilatation) involving a   long segment of terminal ileum.    No other segments of bowel involvement. No evidence for abscess or fistula formation.    CM: What's the status on the Rinvoq?

## 2024-04-09 NOTE — TELEPHONE ENCOUNTER
Patients GI provider:  Dr. Maurer     Number to return call: [phone]     Reason for call: Pt calling stating she is returning a call from Dr. Maurer regarding medication     Scheduled procedure/appointment date if applicable: Apt/procedure

## 2024-04-10 ENCOUNTER — TELEPHONE (OUTPATIENT)
Age: 29
End: 2024-04-10

## 2024-04-10 NOTE — TELEPHONE ENCOUNTER
Reason for call:     Received a call from [facility] requesting a refill for Rinvoq, per patient request. These is no Rinvoq on patients active or inactive med list. I do see that Dr Maurer mentioned in her 1/23/24 office note that she would like the patient to start this medication.    Please review and advise patient.            [x] Refill   [] Prior Auth  [] Other:     Office:   [] PCP/Provider -   [x] Specialty/Provider - GI Dr Brooklyn Maurer  Medication:   Rinvoq    [facility] [phone]

## 2024-04-12 NOTE — TELEPHONE ENCOUNTER
Pt would like a call back to discuss switching mesalamine (LIALDA) 1.2 g EC tablet to Humira or Renvoke.  Pt states you can leave a message on her phone if no answer.

## 2024-07-22 NOTE — TELEPHONE ENCOUNTER
Sent message on MRE result tab:    Rickie Cleveland,     The MRE show terminal ileal disease. We are still awaiting the prior authorization for Rinvoq. If you are having a lot of N/V/D - it might be the stomach flu but let's go ahead and check stool studies. If you could obtain them asap that'll be great. Stay hydrated, but if you can't, definitely proceed to the ER. Keep me posted.     Dr Maurer   injured left 5th digit yesterday: catching a ball.  reports pain, bruising and swelling since.  RHD.  PE:  STS, ecchymosis to 5th left distal phalynx.  Flex/ ext at DIP intact.  Xray:  mildly displaced tuft fracture.  A/P finger fracture: splint and ortho hand follow-up.

## 2024-07-29 ENCOUNTER — OFFICE VISIT (OUTPATIENT)
Dept: GASTROENTEROLOGY | Facility: CLINIC | Age: 29
End: 2024-07-29
Payer: COMMERCIAL

## 2024-07-29 VITALS
SYSTOLIC BLOOD PRESSURE: 128 MMHG | WEIGHT: 182 LBS | HEIGHT: 64 IN | DIASTOLIC BLOOD PRESSURE: 80 MMHG | BODY MASS INDEX: 31.07 KG/M2

## 2024-07-29 DIAGNOSIS — E55.9 VITAMIN D DEFICIENCY: ICD-10-CM

## 2024-07-29 DIAGNOSIS — K50.019 CROHN'S DISEASE OF SMALL INTESTINE WITH COMPLICATION (HCC): Primary | ICD-10-CM

## 2024-07-29 DIAGNOSIS — Z12.11 COLON CANCER SCREENING: ICD-10-CM

## 2024-07-29 PROCEDURE — 99214 OFFICE O/P EST MOD 30 MIN: CPT | Performed by: INTERNAL MEDICINE

## 2024-07-29 NOTE — PROGRESS NOTES
[facility] - Outpatient Follow-up Note  Megha Sprague 28 y.o. female MRN: [id number]  Encounter: [id number]    ASSESSMENT AND PLAN:      1. Crohn's disease of small intestine with complication (HCC)  28-year-old female here today for follow-up of her Crohn's disease.  Recently started on Humira in April 2024 after MRE showing ongoing small bowel inflammation.  Doing much better.    - Comprehensive metabolic panel; Future  - CBC and differential; Future  - Vitamin D 25 hydroxy; Future  - C-reactive protein; Future  - Ambulatory Referral to Dermatology; Future    - medication management discussed with the patient  - immunizations reviewed: Recommend annual flu shot.  Stay up-to-date with pneumococcal vaccination, zoster vaccination.  Recommend non live vaccinations.  - bone health discussed.  Vitamin-D level low, currently on vitamin D supplementation we will recheck in 3 months.  DEXA scan up-to-date if appropriate.  - annual blood work next due September 2024: CBC, CMP, CRP, Vit D, TB QuantiFERON  - colon cancer surveillance up-to-date - next due 5 years  - recommend annual gyn exam  - annual dermatology exam  - smoking cessation discussed    2. Vitamin D deficiency  On vitamin D supplementation, will recheck in September    3. Colon cancer screening  Recall January 2029      Followup Appointment: 6 months  ______________________________________________________________________    Chief Complaint   Patient presents with   • Follow-up     Pt is feeling well with taking Humira and things are going well, no issues     HPI: This is a 28-year-old female with Crohn's disease of the small intestine here today for follow-up.  Previously had colonoscopy with terminal ileal inflammation, MR E showing terminal ileal thickening.  Patient was started on Humira in April 2024 with significant improvement in her bowels and abdominal pain.  Patient denies having any further issues with pain,  bleeding.  Having 2 soft bowel movements a day.  Denies any fevers, rashes, joint pains.  Is not up-to-date with her dermatology exam at this point.  No side effects to the Humira and tolerating it well.    Historical Information   Past Medical History:   Diagnosis Date   • Crohn's disease (HCC)    • Duodenal ulcer    • Gastric ulcer    • GERD (gastroesophageal reflux disease)    • Ileitis 12/2018    seen on colo 12/18/2018   • Lactose intolerance Unknown     Past Surgical History:   Procedure Laterality Date   • ABDOMINAL SURGERY      Ovarian cyst removal   • COLONOSCOPY  12/18/2018   • COLONOSCOPY  12/2018   • OVARIAN CYST REMOVAL     • WISDOM TOOTH EXTRACTION       Social History     Substance and Sexual Activity   Alcohol Use Yes   • Alcohol/week: 1.0 standard drink of alcohol   • Types: 1 Standard drinks or equivalent per week     Social History     Substance and Sexual Activity   Drug Use Never     Social History     Tobacco Use   Smoking Status Never   Smokeless Tobacco Never     Family History   Problem Relation Age of Onset   • Hypertension Mother    • Colon polyps Mother         per GI notes   • Arthritis Mother    • Hyperlipidemia Mother    • Irritable bowel syndrome Mother    • Colon polyps Father    • Hypertension Father    • Diabetes Father    • MAY disease Father    • Hyperlipidemia Father    • MAY disease Brother    • Colon polyps Maternal Uncle    • Crohn's disease Cousin    • Cancer Maternal Grandfather    • Irritable bowel syndrome Maternal Grandfather    • Breast cancer Maternal Grandmother    • Diabetes Paternal Grandfather    • Colorectal Cancer Neg Hx          Current Outpatient Medications:   •  Adalimumab (Humira-CD/UC/HS Starter) 80 MG/0.8ML PNKT  •  cholecalciferol (VITAMIN D3) 1,000 units tablet  •  Junel FE 1/20 1-20 MG-MCG per tablet  •  LORazepam (ATIVAN) 1 mg tablet  No Known Allergies  Reviewed medications and allergies and updated as indicated    PHYSICAL EXAM:    Blood pressure  "128/80, height 5' 4\" (1.626 m), weight 82.6 kg (182 lb). Body mass index is 31.24 kg/m².  General Appearance: NAD, cooperative, alert  Eyes: Anicteric, conjunctiva pink  ENT:  Normocephalic, atraumatic, normal mucosa.    Neck:  Supple, symmetrical, trachea midline  Resp:  Clear to auscultation bilaterally; no rales, rhonchi or wheezing; respirations unlabored   CV:  S1 S2, Regular rate and rhythm; no murmur, rub, or gallop.  GI:  Soft, non-tender, non-distended; normal bowel sounds; no masses, no organomegaly   Rectal: Deferred  Musculoskeletal: No cyanosis, clubbing or edema. Normal ROM.  Skin:  No jaundice, rashes, or lesions   Heme/Lymph: No palpable cervical lymphadenopathy  Psych: Normal affect, good eye contact  Neuro: No gross deficits, AAOx3    Lab Results:   Lab Results   Component Value Date    WBC 8.9 03/22/2024    HGB 13.5 03/22/2024    HCT 41.9 03/22/2024    MCV 88 03/22/2024     03/22/2024     Lab Results   Component Value Date    K 5.1 03/22/2024     03/22/2024    CO2 21 03/22/2024    BUN 14 03/22/2024    CREATININE 1.03 (H) 03/22/2024    AST 18 03/22/2024    ALT 30 03/22/2024    EGFR 77 03/22/2024       Radiology Results:   No results found.    Answers submitted by the patient for this visit:  Inflammatory Bowel Disease (Submitted on 7/28/2024)  When you are not experiencing symptoms of your inflammatory bowel disease, how many bowel movements do you typically have each day?: 2  What is the average (typical) number of bowel movements that you had in a single day during the last week?: 2  Over the last 3 days, have you had any bowel movements where you passed blood without stool?: No  Since your last visit, have you received any vaccinations?: No  Since the last visit, have you had an infection?: No  Is there any possibility that you may be pregnant?: No  In the past three months, have you used tobacco in any form?: No  During the last year, how many days have you missed work or school " because of your inflammatory bowel disease?: 0  During the last year, how many days have you been hospitalized because of your inflammatory bowel disease?: 0  During the last year, how many days have you visited a hospital emergency department because of your inflammatory bowel disease?: 0  During the last month, have you taken narcotic pain medications (such as Percocet, oxycodone, Oxycontin, morphine, Vicodin, Dilaudid, MS Contin) for your inflammatory bowel disease?: No  Have you awoken at night because you needed to move your bowels during the last month? : Yes  Have you had leakage of stool while sleeping during the last month?: No  Have you had leakage of stool while you were awake during the last month?: No  In the last 6 months, have you unintentionally lost weight?: No  Fever: No  Eye irritation: No  Mouth sores: No  Sore throat: No  Chest pain: No  Shortness of breath: No  Numbness or tingling in your hands or feet: No  Skin rash: No  Pain or swelling in your joints: No  Bruising or bleeding: No  Felt depressed or blue: Yes

## 2024-07-31 DIAGNOSIS — E55.9 VITAMIN D DEFICIENCY: ICD-10-CM

## 2024-07-31 RX ORDER — CHOLECALCIFEROL (VITAMIN D3) 25 MCG
2000 TABLET ORAL DAILY
Qty: 200 TABLET | Refills: 1 | Status: SHIPPED | OUTPATIENT
Start: 2024-07-31

## 2025-01-09 ENCOUNTER — TELEPHONE (OUTPATIENT)
Age: 30
End: 2025-01-09

## 2025-01-09 NOTE — TELEPHONE ENCOUNTER
Megha Sprague to [facility] (supporting Brooklyn Maurer MD)         1/7/25  9:53 AM  Hi Dr. Maurer,     I hope this message finds you well. I was just informed by my work that we are changing our pharmacy benefits and I will no longer be going through [facility] for my medication. My insurance was wondering if there is a generic form of Humira they can pursue due to the cost?     Thank you,  Megha Sprague

## 2025-01-09 NOTE — TELEPHONE ENCOUNTER
Megha Sprague to [facility] (supporting Brooklyn Maurer MD)         1/9/25 12:46 AM  Hello,      I was told by the company [employer] directly via email. We did change pharmacy plans this year. I have attached my new card.      Thank you,  Megha Sprague   Attachments   [id number]     [id number]     [id number]

## 2025-05-04 ENCOUNTER — PATIENT MESSAGE (OUTPATIENT)
Dept: GASTROENTEROLOGY | Facility: CLINIC | Age: 30
End: 2025-05-04

## 2025-05-04 DIAGNOSIS — R10.13 EPIGASTRIC PAIN: Primary | ICD-10-CM

## 2025-05-07 ENCOUNTER — TELEPHONE (OUTPATIENT)
Dept: GASTROENTEROLOGY | Facility: CLINIC | Age: 30
End: 2025-05-07

## 2025-05-07 DIAGNOSIS — R10.13 EPIGASTRIC PAIN: Primary | ICD-10-CM

## 2025-05-20 ENCOUNTER — HOSPITAL ENCOUNTER (OUTPATIENT)
Dept: ULTRASOUND IMAGING | Facility: HOSPITAL | Age: 30
Discharge: HOME | End: 2025-05-20
Payer: COMMERCIAL

## 2025-05-20 DIAGNOSIS — R10.13 EPIGASTRIC PAIN: ICD-10-CM

## 2025-05-20 PROCEDURE — 76705 ECHO EXAM OF ABDOMEN: CPT

## 2025-05-25 DIAGNOSIS — E55.9 VITAMIN D DEFICIENCY: ICD-10-CM

## 2025-05-27 RX ORDER — CHOLECALCIFEROL (VITAMIN D3) 25 MCG
2 TABLET ORAL DAILY
Qty: 60 TABLET | Refills: 0 | Status: SHIPPED | OUTPATIENT
Start: 2025-05-27

## 2025-05-28 ENCOUNTER — RESULTS FOLLOW-UP (OUTPATIENT)
Dept: GASTROENTEROLOGY | Facility: CLINIC | Age: 30
End: 2025-05-28

## 2025-05-28 DIAGNOSIS — K21.9 GASTROESOPHAGEAL REFLUX DISEASE WITHOUT ESOPHAGITIS: Primary | ICD-10-CM

## 2025-05-28 RX ORDER — OMEPRAZOLE 20 MG/1
20 CAPSULE, DELAYED RELEASE ORAL 2 TIMES DAILY
Qty: 60 CAPSULE | Refills: 2 | Status: SHIPPED | OUTPATIENT
Start: 2025-05-28 | End: 2025-06-23

## 2025-06-22 DIAGNOSIS — K21.9 GASTROESOPHAGEAL REFLUX DISEASE WITHOUT ESOPHAGITIS: ICD-10-CM

## 2025-06-22 DIAGNOSIS — E55.9 VITAMIN D DEFICIENCY: ICD-10-CM

## 2025-06-23 RX ORDER — CHOLECALCIFEROL (VITAMIN D3) 25 MCG
TABLET ORAL
Qty: 180 TABLET | Refills: 1 | Status: SHIPPED | OUTPATIENT
Start: 2025-06-23

## 2025-06-23 RX ORDER — OMEPRAZOLE 20 MG/1
CAPSULE, DELAYED RELEASE ORAL
Qty: 180 CAPSULE | Refills: 1 | Status: SHIPPED | OUTPATIENT
Start: 2025-06-23